# Patient Record
Sex: FEMALE | Race: WHITE | NOT HISPANIC OR LATINO | URBAN - METROPOLITAN AREA
[De-identification: names, ages, dates, MRNs, and addresses within clinical notes are randomized per-mention and may not be internally consistent; named-entity substitution may affect disease eponyms.]

---

## 2024-09-24 ENCOUNTER — INPATIENT (INPATIENT)
Facility: HOSPITAL | Age: 64
LOS: 2 days | Discharge: HOME | End: 2024-09-27
Attending: STUDENT IN AN ORGANIZED HEALTH CARE EDUCATION/TRAINING PROGRAM | Admitting: STUDENT IN AN ORGANIZED HEALTH CARE EDUCATION/TRAINING PROGRAM
Payer: SELF-PAY

## 2024-09-24 VITALS
SYSTOLIC BLOOD PRESSURE: 101 MMHG | OXYGEN SATURATION: 92 % | TEMPERATURE: 97 F | DIASTOLIC BLOOD PRESSURE: 70 MMHG | WEIGHT: 220.02 LBS | HEART RATE: 89 BPM | RESPIRATION RATE: 17 BRPM

## 2024-09-24 DIAGNOSIS — K46.0 UNSPECIFIED ABDOMINAL HERNIA WITH OBSTRUCTION, WITHOUT GANGRENE: ICD-10-CM

## 2024-09-24 LAB
ALBUMIN SERPL ELPH-MCNC: 3.3 G/DL — SIGNIFICANT CHANGE UP (ref 3.3–5)
ALP SERPL-CCNC: 111 U/L — SIGNIFICANT CHANGE UP (ref 40–120)
ALT FLD-CCNC: 14 U/L — SIGNIFICANT CHANGE UP (ref 4–33)
ANION GAP SERPL CALC-SCNC: 19 MMOL/L — HIGH (ref 7–14)
ANISOCYTOSIS BLD QL: SLIGHT — SIGNIFICANT CHANGE UP
APTT BLD: 26.9 SEC — SIGNIFICANT CHANGE UP (ref 24.5–35.6)
AST SERPL-CCNC: 18 U/L — SIGNIFICANT CHANGE UP (ref 4–32)
BASOPHILS # BLD AUTO: 0 K/UL — SIGNIFICANT CHANGE UP (ref 0–0.2)
BASOPHILS NFR BLD AUTO: 0 % — SIGNIFICANT CHANGE UP (ref 0–2)
BILIRUB SERPL-MCNC: 0.8 MG/DL — SIGNIFICANT CHANGE UP (ref 0.2–1.2)
BLD GP AB SCN SERPL QL: NEGATIVE — SIGNIFICANT CHANGE UP
BLOOD GAS VENOUS COMPREHENSIVE RESULT: SIGNIFICANT CHANGE UP
BUN SERPL-MCNC: 66 MG/DL — HIGH (ref 7–23)
CALCIUM SERPL-MCNC: 8.9 MG/DL — SIGNIFICANT CHANGE UP (ref 8.4–10.5)
CHLORIDE SERPL-SCNC: 75 MMOL/L — LOW (ref 98–107)
CO2 SERPL-SCNC: 26 MMOL/L — SIGNIFICANT CHANGE UP (ref 22–31)
CREAT SERPL-MCNC: 1.4 MG/DL — HIGH (ref 0.5–1.3)
EGFR: 42 ML/MIN/1.73M2 — LOW
EOSINOPHIL # BLD AUTO: 0 K/UL — SIGNIFICANT CHANGE UP (ref 0–0.5)
EOSINOPHIL NFR BLD AUTO: 0 % — SIGNIFICANT CHANGE UP (ref 0–6)
GIANT PLATELETS BLD QL SMEAR: PRESENT — SIGNIFICANT CHANGE UP
GLUCOSE SERPL-MCNC: 139 MG/DL — HIGH (ref 70–99)
HCT VFR BLD CALC: 44.6 % — SIGNIFICANT CHANGE UP (ref 34.5–45)
HGB BLD-MCNC: 15.8 G/DL — HIGH (ref 11.5–15.5)
IANC: 28.4 K/UL — HIGH (ref 1.8–7.4)
INR BLD: 1.44 RATIO — HIGH (ref 0.85–1.16)
LIDOCAIN IGE QN: 49 U/L — SIGNIFICANT CHANGE UP (ref 7–60)
LYMPHOCYTES # BLD AUTO: 1.13 K/UL — SIGNIFICANT CHANGE UP (ref 1–3.3)
LYMPHOCYTES # BLD AUTO: 3.5 % — LOW (ref 13–44)
MACROCYTES BLD QL: SLIGHT — SIGNIFICANT CHANGE UP
MCHC RBC-ENTMCNC: 33.8 PG — SIGNIFICANT CHANGE UP (ref 27–34)
MCHC RBC-ENTMCNC: 35.4 GM/DL — SIGNIFICANT CHANGE UP (ref 32–36)
MCV RBC AUTO: 95.5 FL — SIGNIFICANT CHANGE UP (ref 80–100)
METAMYELOCYTES # FLD: 4.3 % — HIGH (ref 0–1)
MONOCYTES # BLD AUTO: 1.97 K/UL — HIGH (ref 0–0.9)
MONOCYTES NFR BLD AUTO: 6.1 % — SIGNIFICANT CHANGE UP (ref 2–14)
NEUTROPHILS # BLD AUTO: 27.79 K/UL — HIGH (ref 1.8–7.4)
NEUTROPHILS NFR BLD AUTO: 76.5 % — SIGNIFICANT CHANGE UP (ref 43–77)
NEUTS BAND # BLD: 9.6 % — HIGH (ref 0–6)
PLAT MORPH BLD: NORMAL — SIGNIFICANT CHANGE UP
PLATELET # BLD AUTO: 274 K/UL — SIGNIFICANT CHANGE UP (ref 150–400)
PLATELET COUNT - ESTIMATE: NORMAL — SIGNIFICANT CHANGE UP
POIKILOCYTOSIS BLD QL AUTO: SLIGHT — SIGNIFICANT CHANGE UP
POLYCHROMASIA BLD QL SMEAR: SLIGHT — SIGNIFICANT CHANGE UP
POTASSIUM SERPL-MCNC: 4.7 MMOL/L — SIGNIFICANT CHANGE UP (ref 3.5–5.3)
POTASSIUM SERPL-SCNC: 4.7 MMOL/L — SIGNIFICANT CHANGE UP (ref 3.5–5.3)
PROT SERPL-MCNC: 7.2 G/DL — SIGNIFICANT CHANGE UP (ref 6–8.3)
PROTHROM AB SERPL-ACNC: 16.7 SEC — HIGH (ref 9.9–13.4)
RBC # BLD: 4.67 M/UL — SIGNIFICANT CHANGE UP (ref 3.8–5.2)
RBC # FLD: 12.3 % — SIGNIFICANT CHANGE UP (ref 10.3–14.5)
RBC BLD AUTO: ABNORMAL
RH IG SCN BLD-IMP: POSITIVE — SIGNIFICANT CHANGE UP
SODIUM SERPL-SCNC: 120 MMOL/L — CRITICAL LOW (ref 135–145)
WBC # BLD: 32.28 K/UL — HIGH (ref 3.8–10.5)
WBC # FLD AUTO: 32.28 K/UL — HIGH (ref 3.8–10.5)

## 2024-09-24 PROCEDURE — 99285 EMERGENCY DEPT VISIT HI MDM: CPT

## 2024-09-24 PROCEDURE — 88307 TISSUE EXAM BY PATHOLOGIST: CPT | Mod: 26

## 2024-09-24 PROCEDURE — 99223 1ST HOSP IP/OBS HIGH 75: CPT | Mod: GC,25

## 2024-09-24 PROCEDURE — 88305 TISSUE EXAM BY PATHOLOGIST: CPT | Mod: 26

## 2024-09-24 PROCEDURE — 44120 REMOVAL OF SMALL INTESTINE: CPT | Mod: GC,59

## 2024-09-24 PROCEDURE — 49616 RPR AA HRN RCR 3-10 NCR/STRN: CPT | Mod: GC,59

## 2024-09-24 PROCEDURE — 74177 CT ABD & PELVIS W/CONTRAST: CPT | Mod: 26,MC

## 2024-09-24 DEVICE — STAPLER COVIDIEN TRI-STAPLE 60MM PURPLE RELOAD: Type: IMPLANTABLE DEVICE | Status: FUNCTIONAL

## 2024-09-24 DEVICE — STAPLER COVIDIEN TA 60 BLUE: Type: IMPLANTABLE DEVICE | Status: FUNCTIONAL

## 2024-09-24 RX ORDER — SODIUM CHLORIDE IRRIG SOLUTION 0.9 %
1000 SOLUTION, IRRIGATION IRRIGATION
Refills: 0 | Status: DISCONTINUED | OUTPATIENT
Start: 2024-09-24 | End: 2024-09-25

## 2024-09-24 RX ORDER — HYDROMORPHONE HYDROCHLORIDE 1 MG/ML
0.5 INJECTION, SOLUTION INTRAMUSCULAR; INTRAVENOUS; SUBCUTANEOUS ONCE
Refills: 0 | Status: DISCONTINUED | OUTPATIENT
Start: 2024-09-24 | End: 2024-09-24

## 2024-09-24 RX ORDER — IOHEXOL 350 MG/ML
30 INJECTION, SOLUTION INTRAVENOUS ONCE
Refills: 0 | Status: COMPLETED | OUTPATIENT
Start: 2024-09-24 | End: 2024-09-24

## 2024-09-24 RX ORDER — ACETAMINOPHEN 325 MG
1000 TABLET ORAL ONCE
Refills: 0 | Status: COMPLETED | OUTPATIENT
Start: 2024-09-24 | End: 2024-09-24

## 2024-09-24 RX ORDER — FAMOTIDINE 40 MG
20 TABLET ORAL ONCE
Refills: 0 | Status: COMPLETED | OUTPATIENT
Start: 2024-09-24 | End: 2024-09-24

## 2024-09-24 RX ORDER — PIPERACILLIN SODIUM AND TAZOBACTAM SODIUM 12; 1.5 G/60ML; G/60ML
3.38 INJECTION, POWDER, LYOPHILIZED, FOR SOLUTION INTRAVENOUS ONCE
Refills: 0 | Status: COMPLETED | OUTPATIENT
Start: 2024-09-24 | End: 2024-09-24

## 2024-09-24 RX ORDER — ONDANSETRON HCL/PF 4 MG/2 ML
4 VIAL (ML) INJECTION ONCE
Refills: 0 | Status: COMPLETED | OUTPATIENT
Start: 2024-09-24 | End: 2024-09-24

## 2024-09-24 RX ORDER — PIPERACILLIN SODIUM AND TAZOBACTAM SODIUM 12; 1.5 G/60ML; G/60ML
3.38 INJECTION, POWDER, LYOPHILIZED, FOR SOLUTION INTRAVENOUS ONCE
Refills: 0 | Status: DISCONTINUED | OUTPATIENT
Start: 2024-09-24 | End: 2024-09-24

## 2024-09-24 RX ORDER — SODIUM CHLORIDE 0.9 % (FLUSH) 0.9 %
1000 SYRINGE (ML) INJECTION ONCE
Refills: 0 | Status: COMPLETED | OUTPATIENT
Start: 2024-09-24 | End: 2024-09-24

## 2024-09-24 RX ADMIN — Medication 20 MILLIGRAM(S): at 18:36

## 2024-09-24 RX ADMIN — PIPERACILLIN SODIUM AND TAZOBACTAM SODIUM 200 GRAM(S): 12; 1.5 INJECTION, POWDER, LYOPHILIZED, FOR SOLUTION INTRAVENOUS at 21:39

## 2024-09-24 RX ADMIN — Medication 1000 MILLILITER(S): at 18:34

## 2024-09-24 RX ADMIN — IOHEXOL 30 MILLILITER(S): 350 INJECTION, SOLUTION INTRAVENOUS at 19:04

## 2024-09-24 RX ADMIN — Medication 4 MILLIGRAM(S): at 18:38

## 2024-09-24 RX ADMIN — Medication 1000 MILLILITER(S): at 21:39

## 2024-09-24 RX ADMIN — Medication 400 MILLIGRAM(S): at 18:40

## 2024-09-24 RX ADMIN — HYDROMORPHONE HYDROCHLORIDE 0.5 MILLIGRAM(S): 1 INJECTION, SOLUTION INTRAMUSCULAR; INTRAVENOUS; SUBCUTANEOUS at 21:39

## 2024-09-24 NOTE — H&P ADULT - NSHPLABSRESULTS_GEN_ALL_CORE
LABS:  cret                        15.8   32.28 )-----------( 274      ( 24 Sep 2024 18:46 )             44.6     09-24    120[LL]  |  75[L]  |  66[H]  ----------------------------<  139[H]  4.7   |  26  |  1.40[H]    Ca    8.9      24 Sep 2024 18:46    TPro  7.2  /  Alb  3.3  /  TBili  0.8  /  DBili  x   /  AST  18  /  ALT  14  /  AlkPhos  111  09-24            RADIOLOGY     CT w closed loop bowel obstruction with perforation LABS:  cret                        15.8   32.28 )-----------( 274      ( 24 Sep 2024 18:46 )             44.6     09-24    120[LL]  |  75[L]  |  66[H]  ----------------------------<  139[H]  4.7   |  26  |  1.40[H]    Ca    8.9      24 Sep 2024 18:46    TPro  7.2  /  Alb  3.3  /  TBili  0.8  /  DBili  x   /  AST  18  /  ALT  14  /  AlkPhos  111  09-24            RADIOLOGY   CT Abdomen and Pelvis w/ Oral Cont and w/ IV Cont (09.24.24 @ 20:40)     FINDINGS:  LOWER CHEST: Small left pleural effusion with areas of atelectasis and   small consolidation in the bilateral lung bases. Dilated esophagus is   filled with fluid to the top of the study. A few clustered groundglass   opacities in the right lower lobe.    LIVER: Within normal limits.  BILE DUCTS: Normal caliber.  GALLBLADDER: Fluid-filled distended gallbladder is otherwise within   normal limits.  SPLEEN: Within normal limits.  PANCREAS: Within normal limits.  ADRENALS: Within normal limits.  KIDNEYS/URETERS: Symmetric enhancement bilaterally, no hydronephrosis.   Fluid attenuation left renal cyst.    BLADDER: Within normal limits.  REPRODUCTIVE ORGANS: 1.9 cm left adnexal cyst with small area of adjacent   calcification (series 301-95).    BOWEL: Marked dilation of the stomach and entire small bowel which   decompresses upon entrance to large ventral hernia. Within the hernia sac   the loop of bowel re-dilates prior to further decompression upon exit.   Dilated bowel within the sac demonstrates pneumatosis with notable fluid   and free air contained within the hernia sac. A portion of large bowel is   also within the hernia sac and demonstrates no acute inflammation.   Appendix is normal.  PERITONEUM/RETROPERITONEUM: Free air and notable fluid within a large   ventral hernia sac.  VESSELS: Atherosclerotic changes. No portal venous gas identified.  LYMPH NODES: No lymphadenopathy.  ABDOMINAL WALL: Large lower abdominal ventral hernia containing closed   loop bowel obstruction as described above. Opening to the hernia sac   measures approximately 4.6 cm.  BONES: Degenerative changes.    IMPRESSION:  Small bowel obstruction in a closed loop configuration secondary to large   ventral abdominal wall hernia. Pneumatosis in the excluded loop within   the hernia, free air and small volume ascites consistent with ischemic   perforation.    Few clustered right lower lobe groundglass opacities can be seen in the   setting of aspiration in the setting of a mildly distended and   fluid-filled esophagus.

## 2024-09-24 NOTE — H&P ADULT - HISTORY OF PRESENT ILLNESS
B TEAM SURGERY H&P NOTE  ELIZABETH CUI  |  [id number]  |  09-24-24 @ 22:06    HPI: 64F p/w lower abdominal pain. Patient feels like hernia is hard to touch and painful. Has not have a bowel movement in ~2 weeks. Has been having 3 weeks of abdominal pain but has worsened recently.  She has not passed gas from below in over 7 days. Initially had vomiting ~3 weeks ago but has not more recently. Also c/o heartburn. Started omeprazole for symptoms with minimal improvement.  No fevers or chills.  No surgical history.  States the hernia has been present for over 20 years and it is sometimes hard and up but says it would pass after she had a bowel movement.   No bright red blood per rectum.    INTERVAL EVENTS: In the ED, hemodynamically stable. Lactate 2.5, leukocytosis, CT showing closed loop bowel obstruction in the hernia with perforation. Surgery consulted.

## 2024-09-24 NOTE — H&P ADULT - ASSESSMENT
Assessment: 64F no PMH/PSH p/w 3 weeks abdominal pain, obstipation, found to be dehydrated w leukocytosis, elevated lactate, and CT findings of closed loop bowel obstruction with perforation within ventral hernia     Plan   - IV fluid resuscitation   - IV abx: Zosyn   - OR for emergent exploratory laparotomy, possible bowel resection, possible ABThera  - Consent signed in chart     Discussed with Berny Foster    Please contact B Team Surgery (p. [phone]) with any questions.     Lillian Frank DO, PhD  B Team Surgery  Pager [phone]

## 2024-09-24 NOTE — ED ADULT NURSE REASSESSMENT NOTE - NS ED NURSE REASSESS COMMENT FT1
Pt received from intake, alert and oriented x4, able to move all extremities and follow commands. Abdomen firm and distended. OR team at the bedside, NG tube placed to right nostril with med suction. Pt reports some relief of abdominal pain. Brown drainage noted to suction canister. No sob noted. Pending admit to OR. Pt received from intake, alert and oriented x4, able to move all extremities and follow commands. Abdomen firm and distended. Surgery team at the bedside, NG tube placed to right nostril with med suction. Pt reports some relief of abdominal pain. Brown drainage noted to suction canister. No sob noted. Pending admit to OR.

## 2024-09-24 NOTE — ED ADULT NURSE NOTE - OBJECTIVE STATEMENT
Patient coming to ED for vomiting and constipation x 3 weeks. Patient took enema at home but unsuccessful. Patient A&OX4. No distress noted. Breathing non-labored. Labs sent. Left 20g IVL in place and tolerating well. CT scan ordered. Plan of care in progress.

## 2024-09-24 NOTE — H&P ADULT - NSHPPHYSICALEXAM_GEN_ALL_CORE
Vital Signs Last 24 Hrs  T(C): 36.2 (24 Sep 2024 16:30), Max: 36.2 (24 Sep 2024 16:30)  T(F): 97.2 (24 Sep 2024 16:30), Max: 97.2 (24 Sep 2024 16:30)  HR: 89 (24 Sep 2024 16:30) (89 - 89)  BP: 101/70 (24 Sep 2024 16:30) (101/70 - 101/70)  BP(mean): --  RR: 17 (24 Sep 2024 16:30) (17 - 17)  SpO2: 92% (24 Sep 2024 16:30) (92% - 92%)    Parameters below as of 24 Sep 2024 16:30  Patient On (Oxygen Delivery Method): room air    PHYSICAL EXAM  GEN: No acute distress, resting comfortably   HEENT: NCAT   NEURO: no gross deficits   CV: RRR  RESP: No increased work of breathing   ABD: soft, large ventral hernia tense and errythematous, tender to palpation, distended, no rebound or guarding / no peritoneal signs   EXT: warm and well perfused

## 2024-09-24 NOTE — ED ADULT TRIAGE NOTE - CHIEF COMPLAINT QUOTE
pt states 3 weeks ago she was vomiting and developed constipation, was told to take a fleet enema, but was unsuccessful.

## 2024-09-24 NOTE — ED PROVIDER NOTE - PROGRESS NOTE DETAILS
[facility] PGY3 - CT abdomen pelvis with evidence of incarcerated and perforated hernia.  Surgery at bedside for consent for ex lap.  Pain medication ordered.  Admit to surgery for exploratory laparotomy.

## 2024-09-24 NOTE — ED PROVIDER NOTE - GASTROINTESTINAL, MLM
Abdomen   Large hernia in the bilateral lower quadrants of her abdomen that is hard to palpation and tender to palpation.

## 2024-09-24 NOTE — ED PROVIDER NOTE - OBJECTIVE STATEMENT
64-year-old female presents to the ED with lower abdominal pain.  She feels like her hernia is hard to touch and painful.  She also states she has not have a bowel movement in over 7 days.  She has not passed gas from below in over 7 days.  3 weeks ago she had several days of vomiting which is now resolved.  She is complaining of heartburn.  She started omeprazole for symptoms with minimal improvement.  No fevers or chills.  No surgical history.  Says the hernia has been present for over 20 years and it is sometimes hard and up but says it would pass after she had a bowel movement.   No bright red blood per rectum.

## 2024-09-24 NOTE — ED ADULT NURSE NOTE - NSFALLUNIVINTERV_ED_ALL_ED
Bed/Stretcher in lowest position, wheels locked, appropriate side rails in place/Call bell, personal items and telephone in reach/Instruct patient to call for assistance before getting out of bed/chair/stretcher/Non-slip footwear applied when patient is off stretcher/[place] to call system/Physically safe environment - no spills, clutter or unnecessary equipment/Purposeful proactive rounding/Room/bathroom lighting operational, light cord in reach

## 2024-09-24 NOTE — ED PROVIDER NOTE - ATTENDING CONTRIBUTION TO CARE
I, Bradley Arceo DO,  performed the initial face to face bedside interview with this patient regarding history of present illness, review of symptoms and relevant past medical, social and family history.  I completed an independent physical examination.  I was the initial provider who evaluated this patient. I have signed out the follow up of any pending tests (i.e. labs, radiological studies) to the resident.  I have communicated the patient’s plan of care and disposition with the resident.  The history, relevant review of systems, past medical and surgical history, medical decision making, and physical examination was documented by the scribe in my presence and I attest to the accuracy of the documentation.

## 2024-09-24 NOTE — ED PROVIDER NOTE - CLINICAL SUMMARY MEDICAL DECISION MAKING FREE TEXT BOX
Evaluation for adult female with a history of hernia for constipation nausea vomiting no fluctuance.  On exam has a hard tender hernia to the lower quadrants of her abdomen.  She is otherwise well-appearing with normal vital signs.  She is afebrile.  Plan is can be labs UA CT abdomen pelvis with oral and IV contrast to rule out SBO.  Will also get a lactate as there is concern for possible incarcerated strangulated hernia.

## 2024-09-25 LAB
ANION GAP SERPL CALC-SCNC: 13 MMOL/L — SIGNIFICANT CHANGE UP (ref 7–14)
ANION GAP SERPL CALC-SCNC: 13 MMOL/L — SIGNIFICANT CHANGE UP (ref 7–14)
ANION GAP SERPL CALC-SCNC: 16 MMOL/L — HIGH (ref 7–14)
BUN SERPL-MCNC: 59 MG/DL — HIGH (ref 7–23)
BUN SERPL-MCNC: 62 MG/DL — HIGH (ref 7–23)
BUN SERPL-MCNC: 64 MG/DL — HIGH (ref 7–23)
CALCIUM SERPL-MCNC: 6.8 MG/DL — LOW (ref 8.4–10.5)
CALCIUM SERPL-MCNC: 6.9 MG/DL — LOW (ref 8.4–10.5)
CALCIUM SERPL-MCNC: 7.2 MG/DL — LOW (ref 8.4–10.5)
CHLORIDE SERPL-SCNC: 86 MMOL/L — LOW (ref 98–107)
CHLORIDE SERPL-SCNC: 89 MMOL/L — LOW (ref 98–107)
CHLORIDE SERPL-SCNC: 90 MMOL/L — LOW (ref 98–107)
CO2 SERPL-SCNC: 20 MMOL/L — LOW (ref 22–31)
CO2 SERPL-SCNC: 21 MMOL/L — LOW (ref 22–31)
CO2 SERPL-SCNC: 22 MMOL/L — SIGNIFICANT CHANGE UP (ref 22–31)
CREAT SERPL-MCNC: 1.29 MG/DL — SIGNIFICANT CHANGE UP (ref 0.5–1.3)
CREAT SERPL-MCNC: 1.3 MG/DL — SIGNIFICANT CHANGE UP (ref 0.5–1.3)
CREAT SERPL-MCNC: 1.41 MG/DL — HIGH (ref 0.5–1.3)
EGFR: 42 ML/MIN/1.73M2 — LOW
EGFR: 46 ML/MIN/1.73M2 — LOW
EGFR: 46 ML/MIN/1.73M2 — LOW
GAS PNL BLDV: SIGNIFICANT CHANGE UP
GAS PNL BLDV: SIGNIFICANT CHANGE UP
GLUCOSE SERPL-MCNC: 111 MG/DL — HIGH (ref 70–99)
GLUCOSE SERPL-MCNC: 114 MG/DL — HIGH (ref 70–99)
GLUCOSE SERPL-MCNC: 123 MG/DL — HIGH (ref 70–99)
HCT VFR BLD CALC: 35 % — SIGNIFICANT CHANGE UP (ref 34.5–45)
HCT VFR BLD CALC: 35.9 % — SIGNIFICANT CHANGE UP (ref 34.5–45)
HGB BLD-MCNC: 12.1 G/DL — SIGNIFICANT CHANGE UP (ref 11.5–15.5)
HGB BLD-MCNC: 12.7 G/DL — SIGNIFICANT CHANGE UP (ref 11.5–15.5)
MAGNESIUM SERPL-MCNC: 2.1 MG/DL — SIGNIFICANT CHANGE UP (ref 1.6–2.6)
MAGNESIUM SERPL-MCNC: 2.1 MG/DL — SIGNIFICANT CHANGE UP (ref 1.6–2.6)
MAGNESIUM SERPL-MCNC: 2.2 MG/DL — SIGNIFICANT CHANGE UP (ref 1.6–2.6)
MCHC RBC-ENTMCNC: 33.8 PG — SIGNIFICANT CHANGE UP (ref 27–34)
MCHC RBC-ENTMCNC: 34.5 PG — HIGH (ref 27–34)
MCHC RBC-ENTMCNC: 34.6 GM/DL — SIGNIFICANT CHANGE UP (ref 32–36)
MCHC RBC-ENTMCNC: 35.4 GM/DL — SIGNIFICANT CHANGE UP (ref 32–36)
MCV RBC AUTO: 97.6 FL — SIGNIFICANT CHANGE UP (ref 80–100)
MCV RBC AUTO: 97.8 FL — SIGNIFICANT CHANGE UP (ref 80–100)
NRBC # BLD: 0 /100 WBCS — SIGNIFICANT CHANGE UP (ref 0–0)
NRBC # BLD: 0 /100 WBCS — SIGNIFICANT CHANGE UP (ref 0–0)
NRBC # FLD: 0 K/UL — SIGNIFICANT CHANGE UP (ref 0–0)
NRBC # FLD: 0 K/UL — SIGNIFICANT CHANGE UP (ref 0–0)
OSMOLALITY UR: 467 MOSM/KG — SIGNIFICANT CHANGE UP (ref 50–1200)
PHOSPHATE SERPL-MCNC: 4.8 MG/DL — HIGH (ref 2.5–4.5)
PHOSPHATE SERPL-MCNC: 4.9 MG/DL — HIGH (ref 2.5–4.5)
PHOSPHATE SERPL-MCNC: 5.1 MG/DL — HIGH (ref 2.5–4.5)
PLATELET # BLD AUTO: 171 K/UL — SIGNIFICANT CHANGE UP (ref 150–400)
PLATELET # BLD AUTO: 192 K/UL — SIGNIFICANT CHANGE UP (ref 150–400)
POTASSIUM SERPL-MCNC: 3.7 MMOL/L — SIGNIFICANT CHANGE UP (ref 3.5–5.3)
POTASSIUM SERPL-MCNC: 3.9 MMOL/L — SIGNIFICANT CHANGE UP (ref 3.5–5.3)
POTASSIUM SERPL-MCNC: 4.1 MMOL/L — SIGNIFICANT CHANGE UP (ref 3.5–5.3)
POTASSIUM SERPL-SCNC: 3.7 MMOL/L — SIGNIFICANT CHANGE UP (ref 3.5–5.3)
POTASSIUM SERPL-SCNC: 3.9 MMOL/L — SIGNIFICANT CHANGE UP (ref 3.5–5.3)
POTASSIUM SERPL-SCNC: 4.1 MMOL/L — SIGNIFICANT CHANGE UP (ref 3.5–5.3)
RBC # BLD: 3.58 M/UL — LOW (ref 3.8–5.2)
RBC # BLD: 3.68 M/UL — LOW (ref 3.8–5.2)
RBC # FLD: 12.4 % — SIGNIFICANT CHANGE UP (ref 10.3–14.5)
RBC # FLD: 12.5 % — SIGNIFICANT CHANGE UP (ref 10.3–14.5)
SODIUM SERPL-SCNC: 121 MMOL/L — LOW (ref 135–145)
SODIUM SERPL-SCNC: 123 MMOL/L — LOW (ref 135–145)
SODIUM SERPL-SCNC: 126 MMOL/L — LOW (ref 135–145)
SODIUM UR-SCNC: <20 MMOL/L — SIGNIFICANT CHANGE UP
WBC # BLD: 14.01 K/UL — HIGH (ref 3.8–10.5)
WBC # BLD: 17.01 K/UL — HIGH (ref 3.8–10.5)
WBC # FLD AUTO: 14.01 K/UL — HIGH (ref 3.8–10.5)
WBC # FLD AUTO: 17.01 K/UL — HIGH (ref 3.8–10.5)

## 2024-09-25 PROCEDURE — 71045 X-RAY EXAM CHEST 1 VIEW: CPT | Mod: 26,76

## 2024-09-25 RX ORDER — ACETAMINOPHEN 325 MG
1000 TABLET ORAL EVERY 6 HOURS
Refills: 0 | Status: COMPLETED | OUTPATIENT
Start: 2024-09-25 | End: 2024-09-25

## 2024-09-25 RX ORDER — INFLUENZA VIRUS VACCINE 15; 15; 15; 15 UG/.5ML; UG/.5ML; UG/.5ML; UG/.5ML
0.5 SUSPENSION INTRAMUSCULAR ONCE
Refills: 0 | Status: DISCONTINUED | OUTPATIENT
Start: 2024-09-25 | End: 2024-09-27

## 2024-09-25 RX ORDER — CHLORHEXIDINE GLUCONATE ORAL RINSE 1.2 MG/ML
1 SOLUTION DENTAL DAILY
Refills: 0 | Status: DISCONTINUED | OUTPATIENT
Start: 2024-09-25 | End: 2024-09-27

## 2024-09-25 RX ORDER — HYDROMORPHONE HYDROCHLORIDE 1 MG/ML
1 INJECTION, SOLUTION INTRAMUSCULAR; INTRAVENOUS; SUBCUTANEOUS
Refills: 0 | Status: DISCONTINUED | OUTPATIENT
Start: 2024-09-25 | End: 2024-09-25

## 2024-09-25 RX ORDER — SODIUM CHLORIDE 0.9 % (FLUSH) 0.9 %
1000 SYRINGE (ML) INJECTION ONCE
Refills: 0 | Status: COMPLETED | OUTPATIENT
Start: 2024-09-25 | End: 2024-09-25

## 2024-09-25 RX ORDER — SODIUM CHLORIDE 0.9 % (FLUSH) 0.9 %
1000 SYRINGE (ML) INJECTION
Refills: 0 | Status: DISCONTINUED | OUTPATIENT
Start: 2024-09-25 | End: 2024-09-26

## 2024-09-25 RX ORDER — ACETAMINOPHEN 325 MG
1000 TABLET ORAL EVERY 6 HOURS
Refills: 0 | Status: COMPLETED | OUTPATIENT
Start: 2024-09-26 | End: 2024-09-26

## 2024-09-25 RX ORDER — PIPERACILLIN SODIUM AND TAZOBACTAM SODIUM 12; 1.5 G/60ML; G/60ML
3.38 INJECTION, POWDER, LYOPHILIZED, FOR SOLUTION INTRAVENOUS EVERY 8 HOURS
Refills: 0 | Status: DISCONTINUED | OUTPATIENT
Start: 2024-09-25 | End: 2024-09-25

## 2024-09-25 RX ORDER — ENOXAPARIN SODIUM 150 MG/ML
40 INJECTION SUBCUTANEOUS EVERY 24 HOURS
Refills: 0 | Status: DISCONTINUED | OUTPATIENT
Start: 2024-09-25 | End: 2024-09-27

## 2024-09-25 RX ORDER — PIPERACILLIN SODIUM AND TAZOBACTAM SODIUM 12; 1.5 G/60ML; G/60ML
3.38 INJECTION, POWDER, LYOPHILIZED, FOR SOLUTION INTRAVENOUS EVERY 8 HOURS
Refills: 0 | Status: DISCONTINUED | OUTPATIENT
Start: 2024-09-25 | End: 2024-09-27

## 2024-09-25 RX ORDER — HYDROMORPHONE HYDROCHLORIDE 1 MG/ML
0.5 INJECTION, SOLUTION INTRAMUSCULAR; INTRAVENOUS; SUBCUTANEOUS EVERY 4 HOURS
Refills: 0 | Status: DISCONTINUED | OUTPATIENT
Start: 2024-09-25 | End: 2024-09-27

## 2024-09-25 RX ORDER — HYDROMORPHONE HYDROCHLORIDE 1 MG/ML
0.5 INJECTION, SOLUTION INTRAMUSCULAR; INTRAVENOUS; SUBCUTANEOUS
Refills: 0 | Status: DISCONTINUED | OUTPATIENT
Start: 2024-09-25 | End: 2024-09-25

## 2024-09-25 RX ORDER — HYDROMORPHONE HYDROCHLORIDE 1 MG/ML
0.2 INJECTION, SOLUTION INTRAMUSCULAR; INTRAVENOUS; SUBCUTANEOUS EVERY 4 HOURS
Refills: 0 | Status: DISCONTINUED | OUTPATIENT
Start: 2024-09-25 | End: 2024-09-27

## 2024-09-25 RX ADMIN — PIPERACILLIN SODIUM AND TAZOBACTAM SODIUM 25 GRAM(S): 12; 1.5 INJECTION, POWDER, LYOPHILIZED, FOR SOLUTION INTRAVENOUS at 14:15

## 2024-09-25 RX ADMIN — Medication 400 MILLIGRAM(S): at 18:10

## 2024-09-25 RX ADMIN — PIPERACILLIN SODIUM AND TAZOBACTAM SODIUM 25 GRAM(S): 12; 1.5 INJECTION, POWDER, LYOPHILIZED, FOR SOLUTION INTRAVENOUS at 23:00

## 2024-09-25 RX ADMIN — Medication 120 MILLILITER(S): at 03:14

## 2024-09-25 RX ADMIN — Medication 400 MILLIGRAM(S): at 23:58

## 2024-09-25 RX ADMIN — Medication 200 GRAM(S): at 05:19

## 2024-09-25 RX ADMIN — Medication 125 MILLILITER(S): at 14:15

## 2024-09-25 RX ADMIN — Medication 400 MILLIGRAM(S): at 06:27

## 2024-09-25 RX ADMIN — Medication 1000 MILLIGRAM(S): at 18:30

## 2024-09-25 RX ADMIN — Medication 1000 MILLILITER(S): at 03:14

## 2024-09-25 RX ADMIN — Medication 1000 MILLIGRAM(S): at 11:56

## 2024-09-25 RX ADMIN — Medication 120 MILLILITER(S): at 05:17

## 2024-09-25 RX ADMIN — PIPERACILLIN SODIUM AND TAZOBACTAM SODIUM 25 GRAM(S): 12; 1.5 INJECTION, POWDER, LYOPHILIZED, FOR SOLUTION INTRAVENOUS at 06:27

## 2024-09-25 RX ADMIN — Medication 1000 MILLIGRAM(S): at 06:27

## 2024-09-25 RX ADMIN — Medication 1000 MILLILITER(S): at 07:39

## 2024-09-25 RX ADMIN — Medication 400 MILLIGRAM(S): at 11:49

## 2024-09-25 NOTE — CONSULT NOTE ADULT - CONSULT REQUESTED DATE/TIME
Patient  Ingris Rodriguez called concerned in reference to patient Mrs. Alma Rosa Adam not having surgery. Patient's  wished to speak with administration due to concerns.  Mr. Idris Osei was informed he would be contacted by  Brady Irby
Spoke with patient to relay that surgery is available at [facility] on Thursday 9/24/2020. Time is not yet available. Patient elected to have surgery on 9/24/2020. Reviewed where to go to get COVID 19 test and Type and Screen completed on Friday. Patient had no further questions.
25-Sep-2024 05:35

## 2024-09-25 NOTE — CHART NOTE - NSCHARTNOTEFT_GEN_A_CORE
SURGERY POST OP CHECK    STATUS POST PROCEDURE: ex lap, small bowel resection w primary anastomosis, primary hernia repair    SUBJECTIVE: Pt seen and examined without complaints. Pain is controlled. Denies CP/SOB/N/V.   In PACU      OBJECTIVE:  Vital Signs Last 24 Hrs  T(C): 36.6 (25 Sep 2024 03:00), Max: 36.6 (25 Sep 2024 03:00)  T(F): 97.8 (25 Sep 2024 03:00), Max: 97.8 (25 Sep 2024 03:00)  HR: 80 (25 Sep 2024 03:00) (67 - 89)  BP: 113/70 (25 Sep 2024 03:00) (101/70 - 123/68)  BP(mean): 81 (25 Sep 2024 03:00) (72 - 82)  RR: 19 (25 Sep 2024 03:00) (14 - 19)  SpO2: 100% (25 Sep 2024 03:00) (92% - 100%)    Parameters below as of 25 Sep 2024 03:00  Patient On (Oxygen Delivery Method): room air      I&O's Summary    24 Sep 2024 07:01  -  25 Sep 2024 03:36  --------------------------------------------------------  IN: 240 mL / OUT: 409 mL / NET: -169 mL        PHYSICAL EXAM:  Gen: NAD, A&Ox3  Pulm: No respiratory distress, no subcostal retractions  CV: RRR, no JVD  Abd: Soft, NT, ND, incision with dressing in place c/d/i  Drains: SubQ drain in place  Matute in place  NGT in place confirmed by xray   Extremities: FROM, warm and well perfused    ASSESSMENT/PLAN: HPI:  B TEAM SURGERY H&P NOTE  ELIZABETH CUI  |  [id number]  |  09-24-24 @ 22:06    HPI: 64F p/w lower abdominal pain. Patient feels like hernia is hard to touch and painful. Has not have a bowel movement in ~2 weeks. Has been having 3 weeks of abdominal pain but has worsened recently.  She has not passed gas from below in over 7 days. Initially had vomiting ~3 weeks ago but has not more recently. Also c/o heartburn. Started omeprazole for symptoms with minimal improvement.  No fevers or chills.  No surgical history.  States the hernia has been present for over 20 years and it is sometimes hard and up but says it would pass after she had a bowel movement.   No bright red blood per rectum.    INTERVAL EVENTS: In the ED, hemodynamically stable. Lactate 2.5, leukocytosis, CT showing closed loop bowel obstruction in the hernia with perforation. Surgery consulted.   . Pt is s/p ; POD #0 ex lap, small bowel resection w primary anastomosis, primary hernia repair 9/25.  IN PACU, stable at POC, on LR drip. Patient reports feeling well overall. post op sodium 121, 120 pre op. patient potentially going to SICU, pending further workup. Stay in PACU for now.  Will give 1L NS bolus and recheck labs.    - NS bolus 1L  - f/u post bolus labs  - dispo - SICU v floor, keep in PACU until surgical team calls PACU for OK and dispo plan   - Monitor bowel function   - Analgesia and antiemetics as needed  - Strict I&O's  - Monitor NGT  - Monitor TOÑA drain output- TOÑA drain teaching  - Keep Matute  - Incentive spirometry  - DVT prophylaxis: SCD SURGERY POST OP CHECK    STATUS POST PROCEDURE: ex lap, small bowel resection w primary anastomosis, primary hernia repair    SUBJECTIVE: Pt seen and examined without complaints. Pain is controlled. Denies CP/SOB/N/V.   In PACU      OBJECTIVE:  Vital Signs Last 24 Hrs  T(C): 36.6 (25 Sep 2024 03:00), Max: 36.6 (25 Sep 2024 03:00)  T(F): 97.8 (25 Sep 2024 03:00), Max: 97.8 (25 Sep 2024 03:00)  HR: 80 (25 Sep 2024 03:00) (67 - 89)  BP: 113/70 (25 Sep 2024 03:00) (101/70 - 123/68)  BP(mean): 81 (25 Sep 2024 03:00) (72 - 82)  RR: 19 (25 Sep 2024 03:00) (14 - 19)  SpO2: 100% (25 Sep 2024 03:00) (92% - 100%)    Parameters below as of 25 Sep 2024 03:00  Patient On (Oxygen Delivery Method): room air      I&O's Summary    24 Sep 2024 07:01  -  25 Sep 2024 03:36  --------------------------------------------------------  IN: 240 mL / OUT: 409 mL / NET: -169 mL        PHYSICAL EXAM:  Gen: NAD, A&Ox3  Pulm: No respiratory distress, no subcostal retractions  CV: RRR, no JVD  Abd: Soft, NT, ND, incision with prevena in place c/d/i holding suction  Drains: SubQ drain in place  Matute in place  NGT in place confirmed by xray   Extremities: FROM, warm and well perfused    ASSESSMENT/PLAN: HPI:  B TEAM SURGERY H&P NOTE  ELIZABETH CUI  |  [id number]  |  09-24-24 @ 22:06    HPI: 64F p/w lower abdominal pain. Patient feels like hernia is hard to touch and painful. Has not have a bowel movement in ~2 weeks. Has been having 3 weeks of abdominal pain but has worsened recently.  She has not passed gas from below in over 7 days. Initially had vomiting ~3 weeks ago but has not more recently. Also c/o heartburn. Started omeprazole for symptoms with minimal improvement.  No fevers or chills.  No surgical history.  States the hernia has been present for over 20 years and it is sometimes hard and up but says it would pass after she had a bowel movement.   No bright red blood per rectum.    INTERVAL EVENTS: In the ED, hemodynamically stable. Lactate 2.5, leukocytosis, CT showing closed loop bowel obstruction in the hernia with perforation. Surgery consulted.   . Pt is s/p ; POD #0 ex lap, small bowel resection w primary anastomosis, primary hernia repair 9/25.  IN PACU, stable at POC, on LR drip. Patient reports feeling well overall. post op sodium 121, 120 pre op. patient potentially going to SICU, pending further workup. Stay in PACU for now.  Will give 1L NS bolus and recheck labs.    - NS bolus 1L  - f/u post bolus labs  - dispo - SICU v floor, keep in PACU until surgical team calls PACU for OK and dispo plan   - Monitor bowel function   - Analgesia and antiemetics as needed  - Strict I&O's  - Monitor NGT  - Monitor TOÑA drain output- TOÑA drain teaching  - Keep Matute  - Incentive spirometry  - DVT prophylaxis: SCD

## 2024-09-25 NOTE — CHART NOTE - NSCHARTNOTEFT_GEN_A_CORE
patient transferred from sicu to floors  signed out to resident  all questions addressed and answered.    [id number]

## 2024-09-25 NOTE — CONSULT NOTE ADULT - ASSESSMENT
ASSESSMENT:  64F p/w lower abdominal pain. Patient feels like hernia is hard to touch and painful. Has not have a bowel movement in ~2 weeks. Has been having 3 weeks of abdominal pain but has worsened recently.  She has not passed gas from below in over 7 days. CT showing closed loop bowel obstruction in the hernia with perforation. Pt is now s/p ex lap, SBR, umbilical hernia repair w/ lipectomy. Feculent peritonitis w/ purulence encountered intra-operatively. SICU consulted for post-operative hyponatremia and HD monitoring i/s/o case complexity and purulence.     PLAN:  NEUROLOGIC   - Pain control: IV tylenol, prn dilaudid    RESPIRATORY   - Monitor SpO2 goal >92%  - Incentive spirometry     CARDIOVASCULAR   - Monitor hemodynamics     GASTROINTESTINAL   - Diet: NPO  - NGT to LCWS    /RENAL   - IV fluids: NS at 120  - Maintain edmonds catheter, strict Is/Os  - Monitor electrolytes, replete PRN  - trend Na, replete    HEMATOLOGIC  - Monitor H/H   - DVT ppx: Lovenox & SCD's    INFECTIOUS DISEASE  - Monitor fever / WBC    ENDOCRINE  - Monitor gluc    LINES  - Edmonds  - TOÑA x 1  - PIV     DISPO: SICU

## 2024-09-25 NOTE — BRIEF OPERATIVE NOTE - NSICDXBRIEFPOSTOP_GEN_ALL_CORE_FT
POST-OP DIAGNOSIS:  Strangulated ventral hernia 25-Sep-2024 03:56:47  Gosia Mulligan  Small bowel perforation 25-Sep-2024 03:56:53  Gosia Mulligan

## 2024-09-25 NOTE — PROGRESS NOTE ADULT - ASSESSMENT
ASSESSMENT & PLAN:   64F p/w lower abdominal pain. Patient feels like hernia is hard to touch and painful. Has not have a bowel movement in ~2 weeks. Has been having 3 weeks of abdominal pain but has worsened recently.  She has not passed gas from below in over 7 days. CT showing closed loop bowel obstruction in the hernia with perforation. Pt is now s/p ex lap, SBR, umbilical hernia repair w/ lipectomy. Feculent peritonitis w/ purulence encountered intra-operatively. SICU consulted for post-operative hyponatremia and HD monitoring i/s/o case complexity and purulence.       For Follow-Up:  - Zosyn from 9/25-9/29 (4d course)   - trending Na   - continuing NS resuscitation   - Pain control PRN  - NPO with NGT   - Matute maintaining   - DVT ppx: lovenox, SCDs  - f/u AM labs  ASSESSMENT & PLAN:   64F p/w lower abdominal pain. Patient feels like hernia is hard to touch and painful. Has not have a bowel movement in ~2 weeks. Has been having 3 weeks of abdominal pain but has worsened recently.  She has not passed gas from below in over 7 days. CT showing closed loop bowel obstruction in the hernia with perforation. Pt is now s/p ex lap, SBR, umbilical hernia repair w/ lipectomy. Feculent peritonitis w/ purulence encountered intra-operatively. SICU consulted for post-operative hyponatremia and HD monitoring i/s/o case complexity and purulence.     For Follow-Up:  - Zosyn from 9/25-9/29 (4d course)   - trending Na, improving  - Diet: trial CLD today, half IVF if tolerating  - continuing NS resuscitation   - Pain control PRN  - NPO with NGT w/ little to no output. NGT partially self D/C'ed overnight.   - Matute removal today, follow up TOV   - DVT ppx: lovenox, SCDs    B Team  #[id number]     ASSESSMENT & PLAN:   64F p/w lower abdominal pain. Patient feels like hernia is hard to touch and painful. Has not have a bowel movement in ~2 weeks. Has been having 3 weeks of abdominal pain but has worsened recently.  She has not passed gas from below in over 7 days. CT showing closed loop bowel obstruction in the hernia with perforation. Pt is now s/p ex lap, SBR, umbilical hernia repair w/ lipectomy. Feculent peritonitis w/ purulence encountered intra-operatively. SICU consulted for post-operative hyponatremia and HD monitoring i/s/o case complexity and purulence.     For Follow-Up:  - Zosyn from 9/25-9/29 (4d course)   - trending Na, improving  - Diet: trial CLD today, half IVF if tolerating  - continuing NS resuscitation   - Pain control PRN  - NPO with NGT w/ little to no output. NGT partially self D/C'ed overnight and removed this morning.   - Matute removal today, follow up TOV   - DVT ppx: lovenox, SCDs    B Team  #[id number]     ASSESSMENT & PLAN:   64F p/w lower abdominal pain. Patient feels like hernia is hard to touch and painful. Has not have a bowel movement in ~2 weeks. Has been having 3 weeks of abdominal pain but has worsened recently.  She has not passed gas from below in over 7 days. CT showing closed loop bowel obstruction in the hernia with perforation. Pt is now s/p ex lap, SBR, umbilical hernia repair w/ lipectomy. Feculent peritonitis w/ purulence encountered intra-operatively. SICU consulted for post-operative hyponatremia and HD monitoring i/s/o case complexity and purulence.     For Follow-Up:  - Zosyn from 9/25-9/29 (4d course)   - trending Na, improving  - Diet: trial CLD today, half IVF if tolerating  - continuing NS resuscitation   - Pain control PRN  - NPO with NGT w/ little to no output. NGT partially self D/C'ed overnight and removed this morning.   - L TOÑA drain output -> patient self removed this AM   - Matute removal today, follow up TOV   - DVT ppx: lovenox, SCDs    B Team  #[id number]     ASSESSMENT & PLAN:   64F p/w lower abdominal pain. Patient feels like hernia is hard to touch and painful. Has not have a bowel movement in ~2 weeks. Has been having 3 weeks of abdominal pain but has worsened recently.  She has not passed gas from below in over 7 days. CT showing closed loop bowel obstruction in the hernia with perforation. Pt is now s/p ex lap, SBR, umbilical hernia repair w/ lipectomy. Feculent peritonitis w/ purulence encountered intra-operatively. SICU consulted for post-operative hyponatremia and HD monitoring i/s/o case complexity and purulence.     For Follow-Up:  - Zosyn from 9/25-9/29 (4d course)   - trending Na, improving  - Diet: trial CLD today, half IVF if tolerating  - continuing NS resuscitation   - Pain control PRN  - NPO with NGT w/ little to no output. NGT partially pulled out overnight d/t statlock not sticking. Removed this morning.   - Edmonds removed -> TOV @ 1840  - L TOÑA drain output -> patient self removed this AM as she thought it was part of edmonds drainage   - DVT ppx: lovenox, SCDs    B Team  #[id number]     ASSESSMENT & PLAN:   64F p/w lower abdominal pain. Patient feels like hernia is hard to touch and painful. Has not have a bowel movement in ~2 weeks. Has been having 3 weeks of abdominal pain but has worsened recently.  She has not passed gas from below in over 7 days. CT showing closed loop bowel obstruction in the hernia with perforation. Pt is now s/p ex lap, SBR, umbilical hernia repair w/ lipectomy. Feculent peritonitis w/ purulence encountered intra-operatively. SICU consulted for post-operative hyponatremia and HD monitoring i/s/o case complexity and purulence.     For Follow-Up:  - Zosyn from 9/25-9/29 (4d course)   - trending Na, improving  - Diet: NPO/IVF/NGT  - continuing NS resuscitation   - Pain control PRN  - Matute in place  - Monitor L TOÑA drain output  - DVT ppx: lovenox, SCDs    B Team  #[id number]

## 2024-09-25 NOTE — PATIENT PROFILE ADULT - HEALTH LITERACY
Saul Hansen Patient Age: 54 year old  MESSAGE: Interpreting service used: No    Insurance on file confirmed with caller: Yes    IM/FP- Medication Question-     Name of the Pharmacy: [facility]     Name of the medication:    linaGLIPtin (TRADJENTA) 5 MG tablet       Question about: Medication not covered by patient's insurance     Suggested alternative medication (if provided): n/a    Is the patient currently at the pharmacy? No- Routed message to Provider's Clinical Pool    Message read back to caller for accuracy: Yes       ALLERGIES:  Patient has no known allergies.  Current Outpatient Medications   Medication Sig Dispense Refill    metFORMIN (GLUCOPHAGE-XR) 500 MG 24 hr tablet 2 tabs  tablet 1    linaGLIPtin (TRADJENTA) 5 MG tablet Take 1 tablet by mouth daily. 90 tablet 1    atorvastatin (LIPITOR) 20 MG tablet 1 tab daily for 10 days, then increase to 2 tabs daily 180 tablet 1    tamsulosin (FLOMAX) 0.4 MG Cap Take 1 capsule by mouth at bedtime. 90 capsule 3    tadalafil (CIALIS) 20 MG tablet Take 1 tablet by mouth as needed for Erectile Dysfunction. 8 tablet 11     No current facility-administered medications for this visit.     PHARMACY to use: see below           Pharmacy preference(s) on file:   [id number] - [place], [place] - [address] [facility] [address]  [address]  [facility] [address]  Phone: [phone] Fax: [phone]      CALL BACK INFO: Ok to leave response (including medical information) on answering machine      PCP: Chante Steward MD         INS: Payor: [employer] / Plan: [id number] / Product Type: PPO MISC   PATIENT ADDRESS:  [address] [place] [id number]     no

## 2024-09-25 NOTE — CONSULT NOTE ADULT - ATTENDING COMMENTS
The patient was seen and examined, chart and notes reviewed.  The current diagnosis, plan of care and alternatives have been discussed with the patient.  All questions have been answered and updates have been discussed.  The case was discussed with B team residents/PA's and medical students at morning B surgical rounds and throughout the course of the day.    Sepsis secondary to peritonitis  a.  s/p small bowel resection  b.  On zosyn to complete 4 day course    Hyponatremia  a.  Suspect acute nature in light of presumed hypovolemia  b.  Na deficit noted  c.  Continue NSS resuscitation  d.  Check Na q 6    At risk for malnutrition  a.  NPO   b  NGT in place    Respiratory abnormality  a.  On nasal cannula  b.  Encourage incentive spirometry  c.  Postural drainage advised    May transfer to floor

## 2024-09-25 NOTE — PATIENT PROFILE ADULT - FALL HARM RISK - HARM RISK INTERVENTIONS
Assistance with ambulation/Assistance OOB with selected safe patient handling equipment/Communicate Risk of Fall with Harm to all staff/Monitor gait and stability/Reinforce activity limits and safety measures with patient and family/Review medications for side effects contributing to fall risk/Sit up slowly, dangle for a short time, stand at bedside before walking/Tailored Fall Risk Interventions/Toileting schedule using arm’s reach rule for commode and bathroom/Use of alarms - bed, chair and/or voice tab/Visual Cue: Yellow wristband and red socks/Bed in lowest position, wheels locked, appropriate side rails in place/Call bell, personal items and telephone in reach/Instruct patient to call for assistance before getting out of bed or chair/Non-slip footwear when patient is out of bed/[place] to call system/Physically safe environment - no spills, clutter or unnecessary equipment/Purposeful Proactive Rounding/Room/bathroom lighting operational, light cord in reach

## 2024-09-25 NOTE — CONSULT NOTE ADULT - SUBJECTIVE AND OBJECTIVE BOX
SICU Consult Note    HPI:    64F p/w lower abdominal pain. Patient feels like hernia is hard to touch and painful. Has not have a bowel movement in ~2 weeks. Has been having 3 weeks of abdominal pain but has worsened recently.  She has not passed gas from below in over 7 days. Initially had vomiting ~3 weeks ago but has not more recently. Also c/o heartburn. Started omeprazole for symptoms with minimal improvement.  No fevers or chills.  No surgical history.  States the hernia has been present for over 20 years and it is sometimes hard and up but says it would pass after she had a bowel movement.   No bright red blood per rectum. In the ED, hemodynamically stable. Lactate 2.5, leukocytosis, CT showing closed loop bowel obstruction in the hernia with perforation. Pt is now s/p ex lap, SBR, umbilical hernia repair w/ lipectomy. Feculent peritonitis w/ purulence encountered intra-operatively. SICU consulted for post-operative hyponatremia and HD monitoring i/s/o case complexity and purulence.     PMH:   PAST MEDICAL HISTORY:  No pertinent past medical history.     PAST SURGICAL HISTORY:  No significant past surgical history.     FAMILY HISTORY:  No pertinent family history in first degree relatives. No pertinent family history of: noncontributory.      ALLERGIES:  No Known Allergies      --------------------------------------------------------------------------------------    MEDICATIONS:    Neurologic Medications  fentaNYL    Injectable 25 MICROGram(s) IV Push every 5 minutes PRN Moderate Pain (4 - 6)  fentaNYL    Injectable 50 MICROGram(s) IV Push every 15 minutes PRN Severe Pain (7 - 10)  ondansetron Injectable 4 milliGRAM(s) IV Push once PRN Nausea and/or Vomiting    Respiratory Medications    Cardiovascular Medications    Gastrointestinal Medications  lactated ringers. 1000 milliLiter(s) IV Continuous <Continuous>    Genitourinary Medications    Hematologic/Oncologic Medications    Antimicrobial/Immunologic Medications    Endocrine/Metabolic Medications  insulin lispro (ADMELOG) corrective regimen sliding scale   SubCutaneous every 6 hours    Topical/Other Medications  chlorhexidine 4% Liquid 1 Application(s) Topical daily    --------------------------------------------------------------------------------------    VITAL SIGNS:  T(C): 36.6 (09-25-24 @ 03:00), Max: 36.6 (09-25-24 @ 03:00)  HR: 79 (09-25-24 @ 05:00) (67 - 91)  BP: 96/58 (09-25-24 @ 05:00) (96/58 - 123/68)  RR: 20 (09-25-24 @ 05:00) (14 - 20)  SpO2: 100% (09-25-24 @ 05:00) (92% - 100%)  --------------------------------------------------------------------------------------    INS AND OUTS:    09-24-24 @ 07:01  -  09-25-24 @ 05:35  --------------------------------------------------------  IN: 1480 mL / OUT: 564 mL / NET: 916 mL      --------------------------------------------------------------------------------------    EXAM    NEURO: NAD,   HEENT: NC/AT  RESPIRATORY: nonlabored respirations, normal CW expansion  CARDIO: RRR, S1S2  ABDOMEN: soft, moderate tenderness, mild distention, midline prevena holding suction, TOÑA x 1 w/ SSO (mildly bilious), NGT w/ bilious output  EXTREMITIES: normal strength, no deformities    --------------------------------------------------------------------------------------    LABS    --------------------------------------------------------------------------------------

## 2024-09-25 NOTE — BRIEF OPERATIVE NOTE - OPERATION/FINDINGS
Midline laparotomy performed, hernia sac entered carefully with immediate efflux of foul smelling pus. Necrotic omentum noted and resected in order to deliver hernia contents from sac. Hernia noted to contain omentum, colon, and small bowel. Colon appeared pink and viable. Portion of small bowel frankly necrotic with perforation. Approximately 25cm of small bowel resected using endo KANNAN blue 60mm stapler. Side to side functional end to end anastomosis performed using endo KANNAN blue 60mm stapler, common enterotomy closed using TA blue stapler and oversewn using 3-0 vicryl sutures in Lembert fashion. Small bowel and colon easily reduced intraabdominally. Hernia closed primarily using running #1 PDS. Internal retention sutures placed using #2 vicryl. Drain placed above fascia in abscess cavity and brought out LLQ. Skin closed loosely using interrupted 2-0 vicryl. Prevena dressing placed. Midline laparotomy performed, hernia sac entered carefully with immediate efflux of foul smelling pus. Necrotic omentum noted and resected in order to deliver hernia contents from sac. Hernia noted to contain omentum, colon, and small bowel. Colon appeared pink and viable. Portion of small bowel frankly necrotic with perforation. Approximately 25cm of small bowel resected using endo KANNAN purple 60mm stapler. Side to side functional end to end anastomosis performed using endo KANNAN purple 60mm stapler, common enterotomy closed using TA blue stapler and oversewn using 3-0 vicryl sutures in Lembert fashion. Small bowel and colon easily reduced intraabdominally. Hernia closed primarily using running #1 PDS. Internal retention sutures placed using #2 vicryl. Drain placed above fascia in abscess cavity and brought out LLQ. Skin closed loosely using interrupted 2-0 vicryl. Prevena dressing placed.

## 2024-09-25 NOTE — BRIEF OPERATIVE NOTE - NSICDXBRIEFPREOP_GEN_ALL_CORE_FT
PRE-OP DIAGNOSIS:  Strangulated ventral hernia 25-Sep-2024 03:56:32  Gosia Mulligan  Small bowel perforation 25-Sep-2024 03:56:39  Gosia Mulligan

## 2024-09-25 NOTE — BRIEF OPERATIVE NOTE - NSICDXBRIEFPROCEDURE_GEN_ALL_CORE_FT
PROCEDURES:  Exploratory laparotomy 25-Sep-2024 03:55:52  Gosia Mulligan  Small bowel resection 25-Sep-2024 03:55:58  Gosia Mulligan  Repair, hernia, umbilical, with lipectomy 25-Sep-2024 03:56:10  Gosia Mulligan  Resection, omentum, open 25-Sep-2024 03:56:17  Gosia Mulligan

## 2024-09-25 NOTE — PROGRESS NOTE ADULT - SUBJECTIVE AND OBJECTIVE BOX
SURGERY DAILY PROGRESS NOTE:     Overnight Events:  Patient transferred from SICU    SUBJECTIVE: Patient seen and evaluated on AM rounds. Pt is resting comfortably in bed with no complaints. Denies fever, chills, N/V, chest pain, or shortness of breath. Patient is passing gas and having bowel movements. Tolerating diet. Ambulating well. Pain is adequately controlled on current regimen.    OBJECTIVE:  Vital Signs Last 24 Hrs  T(C): 36.7 (25 Sep 2024 17:53), Max: 36.7 (25 Sep 2024 17:53)  T(F): 98.1 (25 Sep 2024 17:53), Max: 98.1 (25 Sep 2024 17:53)  HR: 86 (25 Sep 2024 17:53) (67 - 91)  BP: 109/68 (25 Sep 2024 17:53) (90/54 - 123/68)  BP(mean): 68 (25 Sep 2024 13:00) (65 - 82)  RR: 17 (25 Sep 2024 17:53) (14 - 21)  SpO2: 94% (25 Sep 2024 17:53) (94% - 100%)    Parameters below as of 25 Sep 2024 17:53  Patient On (Oxygen Delivery Method): room air      I&O's Detail    24 Sep 2024 07:01  -  25 Sep 2024 07:00  --------------------------------------------------------  IN:    IV PiggyBack: 150 mL    Lactated Ringers: 360 mL    sodium chloride 0.9%: 360 mL    Sodium Chloride 0.9% Bolus: 1000 mL  Total IN: 1870 mL    OUT:    Bulb (mL): 79 mL    Indwelling Catheter - Urethral (mL): 645 mL    Nasogastric/Oral tube (mL): 50 mL  Total OUT: 774 mL    Total NET: 1096 mL      25 Sep 2024 07:01  -  25 Sep 2024 19:53  --------------------------------------------------------  IN:    IV PiggyBack: 50 mL    sodium chloride 0.9%: 700 mL    Sodium Chloride 0.9% Bolus: 1000 mL  Total IN: 1750 mL    OUT:    Bulb (mL): 22 mL    Indwelling Catheter - Urethral (mL): 570 mL    Nasogastric/Oral tube (mL): 0 mL  Total OUT: 592 mL    Total NET: 1158 mL        Daily Height in cm: 165.1 (25 Sep 2024 02:17)    Daily     LABS:                        12.1   17.01 )-----------( 171      ( 25 Sep 2024 04:25 )             35.0     09-25    126[L]  |  90[L]  |  59[H]  ----------------------------<  111[H]  3.9   |  20[L]  |  1.30    Ca    7.2[L]      25 Sep 2024 09:28  Phos  5.1     09-25  Mg     2.10     09-25    TPro  7.2  /  Alb  3.3  /  TBili  0.8  /  DBili  x   /  AST  18  /  ALT  14  /  AlkPhos  111  09-24    PT/INR - ( 24 Sep 2024 22:11 )   PT: 16.7 sec;   INR: 1.44 ratio         PTT - ( 24 Sep 2024 22:11 )  PTT:26.9 sec  Urinalysis Basic - ( 25 Sep 2024 09:28 )    Color: x / Appearance: x / SG: x / pH: x  Gluc: 111 mg/dL / Ketone: x  / Bili: x / Urobili: x   Blood: x / Protein: x / Nitrite: x   Leuk Esterase: x / RBC: x / WBC x   Sq Epi: x / Non Sq Epi: x / Bacteria: x                PHYSICAL EXAM:  NEURO: NAD,   HEENT: NC/AT  RESPIRATORY: nonlabored respirations, normal CW expansion  ABDOMEN: soft, moderate tenderness, mild distention, midline prevena holding suction, TOÑA x 1 w/ SSO (mildly bilious), NGT w/ bilious output  EXTREMITIES: normal strength, no deformities   SURGERY DAILY PROGRESS NOTE:         SUBJECTIVE: Patient seen and evaluated. Pt is resting comfortably in bed with no complaints. Denies fever, chills, N/V, chest pain, or shortness of breath. Patient is passing gas and having bowel movements. Pain controlled.   Urinating a lot.  OBJECTIVE:  Vital Signs Last 24 Hrs  T(C): 36.7 (25 Sep 2024 17:53), Max: 36.7 (25 Sep 2024 17:53)  T(F): 98.1 (25 Sep 2024 17:53), Max: 98.1 (25 Sep 2024 17:53)  HR: 86 (25 Sep 2024 17:53) (67 - 91)  BP: 109/68 (25 Sep 2024 17:53) (90/54 - 123/68)  BP(mean): 68 (25 Sep 2024 13:00) (65 - 82)  RR: 17 (25 Sep 2024 17:53) (14 - 21)  SpO2: 94% (25 Sep 2024 17:53) (94% - 100%)    Parameters below as of 25 Sep 2024 17:53  Patient On (Oxygen Delivery Method): room air      I&O's Detail    24 Sep 2024 07:01  -  25 Sep 2024 07:00  --------------------------------------------------------  IN:    IV PiggyBack: 150 mL    Lactated Ringers: 360 mL    sodium chloride 0.9%: 360 mL    Sodium Chloride 0.9% Bolus: 1000 mL  Total IN: 1870 mL    OUT:    Bulb (mL): 79 mL    Indwelling Catheter - Urethral (mL): 645 mL    Nasogastric/Oral tube (mL): 50 mL  Total OUT: 774 mL    Total NET: 1096 mL      25 Sep 2024 07:01  -  25 Sep 2024 19:53  --------------------------------------------------------  IN:    IV PiggyBack: 50 mL    sodium chloride 0.9%: 700 mL    Sodium Chloride 0.9% Bolus: 1000 mL  Total IN: 1750 mL    OUT:    Bulb (mL): 22 mL    Indwelling Catheter - Urethral (mL): 570 mL    Nasogastric/Oral tube (mL): 0 mL  Total OUT: 592 mL    Total NET: 1158 mL        Daily Height in cm: 165.1 (25 Sep 2024 02:17)    Daily     LABS:                        12.1   17.01 )-----------( 171      ( 25 Sep 2024 04:25 )             35.0     09-25    126[L]  |  90[L]  |  59[H]  ----------------------------<  111[H]  3.9   |  20[L]  |  1.30    Ca    7.2[L]      25 Sep 2024 09:28  Phos  5.1     09-25  Mg     2.10     09-25    TPro  7.2  /  Alb  3.3  /  TBili  0.8  /  DBili  x   /  AST  18  /  ALT  14  /  AlkPhos  111  09-24    PT/INR - ( 24 Sep 2024 22:11 )   PT: 16.7 sec;   INR: 1.44 ratio         PTT - ( 24 Sep 2024 22:11 )  PTT:26.9 sec  Urinalysis Basic - ( 25 Sep 2024 09:28 )    Color: x / Appearance: x / SG: x / pH: x  Gluc: 111 mg/dL / Ketone: x  / Bili: x / Urobili: x   Blood: x / Protein: x / Nitrite: x   Leuk Esterase: x / RBC: x / WBC x   Sq Epi: x / Non Sq Epi: x / Bacteria: x                PHYSICAL EXAM:  NEURO: NAD,   HEENT: NC/AT  RESPIRATORY: nonlabored respirations, normal CW expansion  ABDOMEN: soft, moderate discomfort, no tenderness per patient, mild distention, midline prevena holding suction, TOÑA x 1 w/ SSO (mildly bilious), NGT w/ bilious output  EXTREMITIES: normal strength, no deformities, FROM   SURGERY DAILY PROGRESS NOTE:         SUBJECTIVE: Patient seen and evaluated. Pt is resting comfortably in bed with no complaints. Denies fever, chills, N/V, chest pain, or shortness of breath. Patient is passing gas and having bowel movements. Pain controlled.   Urinating a lot.  OBJECTIVE:  Vital Signs Last 24 Hrs  T(C): 36.7 (25 Sep 2024 17:53), Max: 36.7 (25 Sep 2024 17:53)  T(F): 98.1 (25 Sep 2024 17:53), Max: 98.1 (25 Sep 2024 17:53)  HR: 86 (25 Sep 2024 17:53) (67 - 91)  BP: 109/68 (25 Sep 2024 17:53) (90/54 - 123/68)  BP(mean): 68 (25 Sep 2024 13:00) (65 - 82)  RR: 17 (25 Sep 2024 17:53) (14 - 21)  SpO2: 94% (25 Sep 2024 17:53) (94% - 100%)    Parameters below as of 25 Sep 2024 17:53  Patient On (Oxygen Delivery Method): room air      I&O's Detail    24 Sep 2024 07:01  -  25 Sep 2024 07:00  --------------------------------------------------------  IN:    IV PiggyBack: 150 mL    Lactated Ringers: 360 mL    sodium chloride 0.9%: 360 mL    Sodium Chloride 0.9% Bolus: 1000 mL  Total IN: 1870 mL    OUT:    Bulb (mL): 79 mL    Indwelling Catheter - Urethral (mL): 645 mL    Nasogastric/Oral tube (mL): 50 mL  Total OUT: 774 mL    Total NET: 1096 mL      25 Sep 2024 07:01  -  25 Sep 2024 19:53  --------------------------------------------------------  IN:    IV PiggyBack: 50 mL    sodium chloride 0.9%: 700 mL    Sodium Chloride 0.9% Bolus: 1000 mL  Total IN: 1750 mL    OUT:    Bulb (mL): 22 mL    Indwelling Catheter - Urethral (mL): 570 mL    Nasogastric/Oral tube (mL): 0 mL  Total OUT: 592 mL    Total NET: 1158 mL        Daily Height in cm: 165.1 (25 Sep 2024 02:17)    Daily     LABS:                        12.1   17.01 )-----------( 171      ( 25 Sep 2024 04:25 )             35.0     09-25    126[L]  |  90[L]  |  59[H]  ----------------------------<  111[H]  3.9   |  20[L]  |  1.30    Ca    7.2[L]      25 Sep 2024 09:28  Phos  5.1     09-25  Mg     2.10     09-25    TPro  7.2  /  Alb  3.3  /  TBili  0.8  /  DBili  x   /  AST  18  /  ALT  14  /  AlkPhos  111  09-24    PT/INR - ( 24 Sep 2024 22:11 )   PT: 16.7 sec;   INR: 1.44 ratio         PTT - ( 24 Sep 2024 22:11 )  PTT:26.9 sec  Urinalysis Basic - ( 25 Sep 2024 09:28 )    Color: x / Appearance: x / SG: x / pH: x  Gluc: 111 mg/dL / Ketone: x  / Bili: x / Urobili: x   Blood: x / Protein: x / Nitrite: x   Leuk Esterase: x / RBC: x / WBC x   Sq Epi: x / Non Sq Epi: x / Bacteria: x                PHYSICAL EXAM:  NEURO: NAD, resting in chair  HEENT: NC/AT  RESPIRATORY: nonlabored respirations, normal CW expansion  ABDOMEN: soft, appropriate tenderness, mild distention, midline prevena holding suction, TOÑA x 1 w/ SSO (mildly bilious), NGT w/ minimal to no output. NGT partially self d/c'd as statlock not in place. Removed at bedside.  EXTREMITIES: normal strength, no deformities, FROM   SURGERY DAILY PROGRESS NOTE:         SUBJECTIVE: Patient seen and evaluated. Pt is resting comfortably in bed with no complaints. Denies fever, chills, N/V, chest pain, or shortness of breath. Patient is passing gas and having bowel movements. Pain controlled.   Urinating a lot.  OBJECTIVE:  Vital Signs Last 24 Hrs  T(C): 36.7 (25 Sep 2024 17:53), Max: 36.7 (25 Sep 2024 17:53)  T(F): 98.1 (25 Sep 2024 17:53), Max: 98.1 (25 Sep 2024 17:53)  HR: 86 (25 Sep 2024 17:53) (67 - 91)  BP: 109/68 (25 Sep 2024 17:53) (90/54 - 123/68)  BP(mean): 68 (25 Sep 2024 13:00) (65 - 82)  RR: 17 (25 Sep 2024 17:53) (14 - 21)  SpO2: 94% (25 Sep 2024 17:53) (94% - 100%)    Parameters below as of 25 Sep 2024 17:53  Patient On (Oxygen Delivery Method): room air      I&O's Detail    24 Sep 2024 07:01  -  25 Sep 2024 07:00  --------------------------------------------------------  IN:    IV PiggyBack: 150 mL    Lactated Ringers: 360 mL    sodium chloride 0.9%: 360 mL    Sodium Chloride 0.9% Bolus: 1000 mL  Total IN: 1870 mL    OUT:    Bulb (mL): 79 mL    Indwelling Catheter - Urethral (mL): 645 mL    Nasogastric/Oral tube (mL): 50 mL  Total OUT: 774 mL    Total NET: 1096 mL      25 Sep 2024 07:01  -  25 Sep 2024 19:53  --------------------------------------------------------  IN:    IV PiggyBack: 50 mL    sodium chloride 0.9%: 700 mL    Sodium Chloride 0.9% Bolus: 1000 mL  Total IN: 1750 mL    OUT:    Bulb (mL): 22 mL    Indwelling Catheter - Urethral (mL): 570 mL    Nasogastric/Oral tube (mL): 0 mL  Total OUT: 592 mL    Total NET: 1158 mL        Daily Height in cm: 165.1 (25 Sep 2024 02:17)    Daily     LABS:                        12.1   17.01 )-----------( 171      ( 25 Sep 2024 04:25 )             35.0     09-25    126[L]  |  90[L]  |  59[H]  ----------------------------<  111[H]  3.9   |  20[L]  |  1.30    Ca    7.2[L]      25 Sep 2024 09:28  Phos  5.1     09-25  Mg     2.10     09-25    TPro  7.2  /  Alb  3.3  /  TBili  0.8  /  DBili  x   /  AST  18  /  ALT  14  /  AlkPhos  111  09-24    PT/INR - ( 24 Sep 2024 22:11 )   PT: 16.7 sec;   INR: 1.44 ratio         PTT - ( 24 Sep 2024 22:11 )  PTT:26.9 sec  Urinalysis Basic - ( 25 Sep 2024 09:28 )    Color: x / Appearance: x / SG: x / pH: x  Gluc: 111 mg/dL / Ketone: x  / Bili: x / Urobili: x   Blood: x / Protein: x / Nitrite: x   Leuk Esterase: x / RBC: x / WBC x   Sq Epi: x / Non Sq Epi: x / Bacteria: x                PHYSICAL EXAM:  NEURO: NAD, resting in chair  HEENT: NC/AT  RESPIRATORY: nonlabored respirations, normal CW expansion  ABDOMEN: soft, appropriate tenderness, mild distention, midline prevena holding suction, TOÑA x 1 w/ SSO (mildly bilious), NGT w/ minimal to no output. NGT partially self d/c'd overnight as statlock not in place. Removed at bedside. TOÑA drain with ss output.   EXTREMITIES: normal strength, no deformities, FROM   SURGERY DAILY PROGRESS NOTE:         SUBJECTIVE: Patient seen and evaluated. Pt is resting comfortably in bed with no complaints. Denies fever, chills, N/V, chest pain, or shortness of breath. Patient is passing gas and having bowel movements. Pain controlled.   Urinating a lot.  OBJECTIVE:  Vital Signs Last 24 Hrs  T(C): 36.7 (25 Sep 2024 17:53), Max: 36.7 (25 Sep 2024 17:53)  T(F): 98.1 (25 Sep 2024 17:53), Max: 98.1 (25 Sep 2024 17:53)  HR: 86 (25 Sep 2024 17:53) (67 - 91)  BP: 109/68 (25 Sep 2024 17:53) (90/54 - 123/68)  BP(mean): 68 (25 Sep 2024 13:00) (65 - 82)  RR: 17 (25 Sep 2024 17:53) (14 - 21)  SpO2: 94% (25 Sep 2024 17:53) (94% - 100%)    Parameters below as of 25 Sep 2024 17:53  Patient On (Oxygen Delivery Method): room air      I&O's Detail    24 Sep 2024 07:01  -  25 Sep 2024 07:00  --------------------------------------------------------  IN:    IV PiggyBack: 150 mL    Lactated Ringers: 360 mL    sodium chloride 0.9%: 360 mL    Sodium Chloride 0.9% Bolus: 1000 mL  Total IN: 1870 mL    OUT:    Bulb (mL): 79 mL    Indwelling Catheter - Urethral (mL): 645 mL    Nasogastric/Oral tube (mL): 50 mL  Total OUT: 774 mL    Total NET: 1096 mL      25 Sep 2024 07:01  -  25 Sep 2024 19:53  --------------------------------------------------------  IN:    IV PiggyBack: 50 mL    sodium chloride 0.9%: 700 mL    Sodium Chloride 0.9% Bolus: 1000 mL  Total IN: 1750 mL    OUT:    Bulb (mL): 22 mL    Indwelling Catheter - Urethral (mL): 570 mL    Nasogastric/Oral tube (mL): 0 mL  Total OUT: 592 mL    Total NET: 1158 mL        Daily Height in cm: 165.1 (25 Sep 2024 02:17)    Daily     LABS:                        12.1   17.01 )-----------( 171      ( 25 Sep 2024 04:25 )             35.0     09-25    126[L]  |  90[L]  |  59[H]  ----------------------------<  111[H]  3.9   |  20[L]  |  1.30    Ca    7.2[L]      25 Sep 2024 09:28  Phos  5.1     09-25  Mg     2.10     09-25    TPro  7.2  /  Alb  3.3  /  TBili  0.8  /  DBili  x   /  AST  18  /  ALT  14  /  AlkPhos  111  09-24    PT/INR - ( 24 Sep 2024 22:11 )   PT: 16.7 sec;   INR: 1.44 ratio         PTT - ( 24 Sep 2024 22:11 )  PTT:26.9 sec  Urinalysis Basic - ( 25 Sep 2024 09:28 )    Color: x / Appearance: x / SG: x / pH: x  Gluc: 111 mg/dL / Ketone: x  / Bili: x / Urobili: x   Blood: x / Protein: x / Nitrite: x   Leuk Esterase: x / RBC: x / WBC x   Sq Epi: x / Non Sq Epi: x / Bacteria: x                PHYSICAL EXAM:  NEURO: NAD, resting in chair  HEENT: NC/AT  RESPIRATORY: nonlabored respirations, normal CW expansion  ABDOMEN: soft, appropriate tenderness, mild distention, midline prevena holding suction, TOÑA x 1 w/ SSO (mildly bilious), NGT w/ minimal to no output. TOÑA drain with ss output.   EXTREMITIES: normal strength, no deformities, FROM

## 2024-09-26 LAB
ANION GAP SERPL CALC-SCNC: 13 MMOL/L — SIGNIFICANT CHANGE UP (ref 7–14)
BUN SERPL-MCNC: 50 MG/DL — HIGH (ref 7–23)
CALCIUM SERPL-MCNC: 7.9 MG/DL — LOW (ref 8.4–10.5)
CHLORIDE SERPL-SCNC: 94 MMOL/L — LOW (ref 98–107)
CO2 SERPL-SCNC: 22 MMOL/L — SIGNIFICANT CHANGE UP (ref 22–31)
CREAT SERPL-MCNC: 1.01 MG/DL — SIGNIFICANT CHANGE UP (ref 0.5–1.3)
EGFR: 62 ML/MIN/1.73M2 — SIGNIFICANT CHANGE UP
GLUCOSE SERPL-MCNC: 113 MG/DL — HIGH (ref 70–99)
HCT VFR BLD CALC: 36 % — SIGNIFICANT CHANGE UP (ref 34.5–45)
HGB BLD-MCNC: 12.2 G/DL — SIGNIFICANT CHANGE UP (ref 11.5–15.5)
MAGNESIUM SERPL-MCNC: 2.4 MG/DL — SIGNIFICANT CHANGE UP (ref 1.6–2.6)
MCHC RBC-ENTMCNC: 33.7 PG — SIGNIFICANT CHANGE UP (ref 27–34)
MCHC RBC-ENTMCNC: 33.9 GM/DL — SIGNIFICANT CHANGE UP (ref 32–36)
MCV RBC AUTO: 99.4 FL — SIGNIFICANT CHANGE UP (ref 80–100)
NRBC # BLD: 0 /100 WBCS — SIGNIFICANT CHANGE UP (ref 0–0)
NRBC # FLD: 0 K/UL — SIGNIFICANT CHANGE UP (ref 0–0)
PHOSPHATE SERPL-MCNC: 4.2 MG/DL — SIGNIFICANT CHANGE UP (ref 2.5–4.5)
PLATELET # BLD AUTO: 155 K/UL — SIGNIFICANT CHANGE UP (ref 150–400)
POTASSIUM SERPL-MCNC: 3.5 MMOL/L — SIGNIFICANT CHANGE UP (ref 3.5–5.3)
POTASSIUM SERPL-SCNC: 3.5 MMOL/L — SIGNIFICANT CHANGE UP (ref 3.5–5.3)
RBC # BLD: 3.62 M/UL — LOW (ref 3.8–5.2)
RBC # FLD: 13.2 % — SIGNIFICANT CHANGE UP (ref 10.3–14.5)
SODIUM SERPL-SCNC: 129 MMOL/L — LOW (ref 135–145)
WBC # BLD: 19.71 K/UL — HIGH (ref 3.8–10.5)
WBC # FLD AUTO: 19.71 K/UL — HIGH (ref 3.8–10.5)

## 2024-09-26 RX ORDER — ACETAMINOPHEN 325 MG
1000 TABLET ORAL EVERY 6 HOURS
Refills: 0 | Status: DISCONTINUED | OUTPATIENT
Start: 2024-09-27 | End: 2024-09-27

## 2024-09-26 RX ORDER — POTASSIUM CHLORIDE, SODIUM CHLORIDE, CALCIUM CHLORIDE, SODIUM LACTATE, AND DEXTROSE MONOHYDRATE 1.79; 6; .2; 3.1; 5 G/1000ML; G/1000ML; G/1000ML; G/1000ML; G/1000ML
1000 INJECTION, SOLUTION INTRAVENOUS
Refills: 0 | Status: DISCONTINUED | OUTPATIENT
Start: 2024-09-26 | End: 2024-09-26

## 2024-09-26 RX ORDER — SODIUM CHLORIDE 0.9 % (FLUSH) 0.9 %
1000 SYRINGE (ML) INJECTION
Refills: 0 | Status: DISCONTINUED | OUTPATIENT
Start: 2024-09-26 | End: 2024-09-27

## 2024-09-26 RX ADMIN — Medication 1000 MILLIGRAM(S): at 06:15

## 2024-09-26 RX ADMIN — Medication 1000 MILLIGRAM(S): at 00:30

## 2024-09-26 RX ADMIN — Medication 75 MILLILITER(S): at 10:22

## 2024-09-26 RX ADMIN — Medication 50 MILLIEQUIVALENT(S): at 10:22

## 2024-09-26 RX ADMIN — PIPERACILLIN SODIUM AND TAZOBACTAM SODIUM 25 GRAM(S): 12; 1.5 INJECTION, POWDER, LYOPHILIZED, FOR SOLUTION INTRAVENOUS at 05:54

## 2024-09-26 RX ADMIN — Medication 1000 MILLIGRAM(S): at 19:15

## 2024-09-26 RX ADMIN — ENOXAPARIN SODIUM 40 MILLIGRAM(S): 150 INJECTION SUBCUTANEOUS at 18:32

## 2024-09-26 RX ADMIN — Medication 1000 MILLIGRAM(S): at 13:25

## 2024-09-26 RX ADMIN — CHLORHEXIDINE GLUCONATE ORAL RINSE 1 APPLICATION(S): 1.2 SOLUTION DENTAL at 12:57

## 2024-09-26 RX ADMIN — Medication 400 MILLIGRAM(S): at 05:54

## 2024-09-26 RX ADMIN — Medication 400 MILLIGRAM(S): at 12:57

## 2024-09-26 RX ADMIN — PIPERACILLIN SODIUM AND TAZOBACTAM SODIUM 25 GRAM(S): 12; 1.5 INJECTION, POWDER, LYOPHILIZED, FOR SOLUTION INTRAVENOUS at 14:58

## 2024-09-26 RX ADMIN — Medication 400 MILLIGRAM(S): at 23:39

## 2024-09-26 RX ADMIN — Medication 400 MILLIGRAM(S): at 18:28

## 2024-09-26 RX ADMIN — PIPERACILLIN SODIUM AND TAZOBACTAM SODIUM 25 GRAM(S): 12; 1.5 INJECTION, POWDER, LYOPHILIZED, FOR SOLUTION INTRAVENOUS at 21:49

## 2024-09-26 NOTE — PROGRESS NOTE ADULT - SUBJECTIVE AND OBJECTIVE BOX
ANESTHESIA POSTOP CHECK    64y Female POSTOP DAY 1      Vital Signs Last 24 Hrs  T(C): 36.5 (26 Sep 2024 05:58), Max: 36.7 (25 Sep 2024 17:53)  T(F): 97.7 (26 Sep 2024 05:58), Max: 98.1 (25 Sep 2024 17:53)  HR: 83 (26 Sep 2024 05:58) (78 - 88)  BP: 123/70 (26 Sep 2024 05:58) (97/55 - 123/70)  BP(mean): 68 (25 Sep 2024 13:00) (67 - 68)  RR: 17 (26 Sep 2024 05:58) (17 - 21)  SpO2: 98% (26 Sep 2024 05:58) (94% - 100%)    Parameters below as of 26 Sep 2024 05:58  Patient On (Oxygen Delivery Method): room air            [x ] NO APPARENT ANESTHESIA COMPLICATIONS

## 2024-09-26 NOTE — PROGRESS NOTE ADULT - ASSESSMENT
64F p/w lower abdominal pain. Patient feels like hernia is hard to touch and painful. Has not have a bowel movement in ~2 weeks. Has been having 3 weeks of abdominal pain but has worsened recently.  She has not passed gas from below in over 7 days. CT showing closed loop bowel obstruction in the hernia with perforation. Pt is now s/p ex lap, SBR, umbilical hernia repair w/ lipectomy. Feculent peritonitis w/ purulence encountered intra-operatively. SICU consulted for post-operative hyponatremia and HD monitoring i/s/o case complexity and purulence.     For Follow-Up:  - Zosyn from 9/25-9/29 (4d course)   - trending Na, improving  - Diet: trial CLD today, half IVF if tolerating  - continuing NS resuscitation   - Pain control PRN  - NPO with NGT w/ little to no output. Pt evaluated with NGT removed FDC d/t statlock not sticking. Removed by team.  - Edmonds removed -> TOV @ 1840  - L TOÑA drain output -> patient self removed this AM as she thought it was part of edmonds drainage   - DVT ppx: lovenox, SCDs    B Team  #[id number]

## 2024-09-26 NOTE — DISCHARGE NOTE PROVIDER - NSDCCPTREATMENT_GEN_ALL_CORE_FT
PRINCIPAL PROCEDURE  Procedure: Exploratory laparotomy with small bowel resection  Findings and Treatment: WOUND CARE:  Please keep incisions clean and dry. Please do not Scrub or rub incisions. Do not use lotion or powder on incisions  BATHING: Please do not submerge wound underwater. You may You may shower.   Do not sit in water.  You may let water run over incision.  Pat incision dry.  Do not scrub.  Keep incision clean and dry. and/or sponge bathe 48 hours after surgery.  ACTIVITY: No heavy lifting or straining. Otherwise, you may return to your usual level of physical activity. If you are taking Do not Do not drive while taking pain medications. or operate heavy machinery or make important decisions while on Do not Do not drive while taking pain medications. or operate heavy machinery or make important decisions while on Do not drive or operate heavy machinery or make important decisions while on narcotic pain medication. pain medication. pain medication. pain medication (such as Oxycodone) DO NOT Do not drive while taking pain medications. a car, operate machinery or make important decisions.  DIET: Return to your usual diet.  NOTIFY YOUR SURGEON IF: You have any bleeding that does not stop, any pus draining from your wound(s), any fever (over 100.4 F) or chills, persistent nausea/vomiting, persistent diarrhea, or if your pain is not controlled on your discharge pain medications.  FOLLOW-UP: Please follow up with your primary care physician in one week regarding your hospitalization.  Please follow up with your surgeon.  Call today for appointment in 1 week.       PRINCIPAL PROCEDURE  Procedure: Exploratory laparotomy with small bowel resection  Findings and Treatment:

## 2024-09-26 NOTE — PHYSICAL THERAPY INITIAL EVALUATION ADULT - PATIENT/FAMILY AGREES WITH PLAN
yes NORMA Parada (DO)  Pulmonary Disease; Sleep Medicine  [address] [place] [address]  Phone: [phone]  Fax: [phone]  Established Patient  Follow Up Time:     Pete Espinal)  Internal Medicine  [address]  [place] [address]  Phone: [phone]  Fax: [phone]  Established Patient  Follow Up Time:     Sharri Wyatt (DO)  Cardiovascular Disease; Internal Medicine  [address] [facility], [address]  [place] [address]  Phone: [phone]  Fax: [phone]  Established Patient  Follow Up Time:     Anderson Orona)  Internal Medicine  [address] [facility], [address], [place] [address]  Phone: [phone]  Fax: [phone]  Established Patient  Follow Up Time:    NORMA Parada (DO)  Pulmonary Disease; Sleep Medicine  [address]  [place] [address]  Phone: [phone]  Fax: [phone]  Established Patient  Follow Up Time:     Pete Espinal)  Internal Medicine  [address]  [place] [address]  Phone: [phone]  Fax: [phone]  Established Patient  Follow Up Time:     Sharri Wyatt (DO)  Cardiovascular Disease; Internal Medicine  [address] [facility], [address]  [place] [address]  Phone: [phone]  Fax: [phone]  Established Patient  Follow Up Time:     Anderson Orona)  Internal Medicine  [address] [facility], [address], [place] [address]  Phone: [phone]  Fax: [phone]  Established Patient  Follow Up Time:     Chad Cristina)  Medicine  [address]  [place] [address]  Phone: [phone]  Fax: [phone]  Follow Up Time:

## 2024-09-26 NOTE — PHYSICAL THERAPY INITIAL EVALUATION ADULT - GENERAL OBSERVATIONS, REHAB EVAL
Consult received, chart reviewed. Patient received seated in recliner chair, no apparent distress, +wound vac. Pt. agreeable to participate in PT.

## 2024-09-26 NOTE — PROGRESS NOTE ADULT - SUBJECTIVE AND OBJECTIVE BOX
SUBJECTIVE: Patient seen and evaluated. Pt is resting comfortably in bed with no complaints. Denies fever, chills, N/V, chest pain, or shortness of breath. Patient is passing gas and having bowel movements. Pain controlled.     PHYSICAL EXAM:  NEURO: NAD, resting in chair  HEENT: NC/AT  RESPIRATORY: nonlabored respirations, normal CW expansion  ABDOMEN: soft, appropriate tenderness, mild distention, midline prevena holding suction, TOÑA x 1 w/ SSO (mildly bilious), NGT w/ minimal to no output. NGT partially self d/c'd overnight as statlock not in place. Removed at bedside. TOÑA drain with ss output.   EXTREMITIES: normal strength, no deformities, FROM    Vital Signs Last 24 Hrs  T(C): 36.5 (26 Sep 2024 05:58), Max: 36.7 (25 Sep 2024 17:53)  T(F): 97.7 (26 Sep 2024 05:58), Max: 98.1 (25 Sep 2024 17:53)  HR: 83 (26 Sep 2024 05:58) (77 - 88)  BP: 123/70 (26 Sep 2024 05:58) (90/54 - 123/70)  BP(mean): 68 (25 Sep 2024 13:00) (65 - 68)  RR: 17 (26 Sep 2024 05:58) (16 - 21)  SpO2: 98% (26 Sep 2024 05:58) (94% - 100%)    Parameters below as of 26 Sep 2024 05:58  Patient On (Oxygen Delivery Method): room air        I&O's Summary    25 Sep 2024 07:01  -  26 Sep 2024 07:00  --------------------------------------------------------  IN: 3450 mL / OUT: 1522 mL / NET: 1928 mL          LABS:                        12.2   19.71 )-----------( 155      ( 26 Sep 2024 05:49 )             36.0     09-26    129[L]  |  94[L]  |  50[H]  ----------------------------<  113[H]  3.5   |  22  |  1.01    Ca    7.9[L]      26 Sep 2024 05:49  Phos  4.2     09-26  Mg     2.40     09-26    TPro  7.2  /  Alb  3.3  /  TBili  0.8  /  DBili  x   /  AST  18  /  ALT  14  /  AlkPhos  111  09-24    PT/INR - ( 24 Sep 2024 22:11 )   PT: 16.7 sec;   INR: 1.44 ratio         PTT - ( 24 Sep 2024 22:11 )  PTT:26.9 sec  Urinalysis Basic - ( 26 Sep 2024 05:49 )    Color: x / Appearance: x / SG: x / pH: x  Gluc: 113 mg/dL / Ketone: x  / Bili: x / Urobili: x   Blood: x / Protein: x / Nitrite: x   Leuk Esterase: x / RBC: x / WBC x   Sq Epi: x / Non Sq Epi: x / Bacteria: x        RADIOLOGY & ADDITIONAL STUDIES:

## 2024-09-26 NOTE — DISCHARGE NOTE PROVIDER - NSDCCPCAREPLAN_GEN_ALL_CORE_FT
PRINCIPAL DISCHARGE DIAGNOSIS  Diagnosis: Incarcerated hernia  Assessment and Plan of Treatment: OR: Exploratory Laparotomy, Incarcerated Ventral Hernia Repair, Small Bowel Resection (25cm)      SECONDARY DISCHARGE DIAGNOSES  Diagnosis: Bowel perforation  Assessment and Plan of Treatment: OR: Exploratory Lapartomy, Incarcerated Ventral Hernia Repair, Small Bowel Resection (25cm)

## 2024-09-26 NOTE — DISCHARGE NOTE PROVIDER - NSDCFUADDINST_GEN_ALL_CORE_FT
WOUND CARE: ** PLEASE KEEP ABDOMINAL PREVENA (PURPLE SPONGE) ON SUCTION AND REMOVE ON MONDAY 9/30/24. During this time, if prevena wound vac is off suction and sponge is inflated for > 2 hours, please call surgery office to notify them and possible remove, as this is increased risk for infection. To remove prevena, turn off battery pack and gently peel.   - Please keep incisions clean and dry. Please do not Scrub or rub incisions. Do not use lotion or powder on incisions.   BATHING: You may shower and/or sponge bathe. You may use warm soapy water in the shower and rinse, pat dry.  ACTIVITY: No heavy lifting or straining. Otherwise, you may return to your usual level of physical activity. If you are taking narcotic pain medication DO NOT drive a car, operate machinery or make important decisions.  DIET: Return to your usual diet.  NOTIFY YOUR SURGEON IF YOU HAVE: any bleeding that does not stop, any pus draining from your wound(s), any fever (over 100.4 F) persistent nausea/vomiting, or if your pain is not controlled on your discharge pain medications, unable to urinate.  Please follow up with your primary care physician in one week regarding your hospitalization, bring copies of your discharge paperwork.  Please follow up with your surgeon, Dr. Berny Foster. Please call [phone] to make an appointment 2-3 weeks after discharge from the hospital.

## 2024-09-26 NOTE — DISCHARGE NOTE PROVIDER - HOSPITAL COURSE
64F no PMH/PSH p/w 3 weeks abdominal pain, obstipation, found to be dehydrated w leukocytosis, elevated lactate, and CT findings of   closed loop bowel obstruction with perforation within ventral hernia. Patient was admitted to [facility], made NPO/IVF/IV Zosyn, consented and  booked emergently for the OR. Patient went to OR for Exploratory Laparotomy Repair of Incarcerated Ventral Hernia SBR (25cm) with JPdrain  placement. Surgery went will. Post operatively, SICU was consulted due to hyponatremia that was present on admission (121) and   improved post operatively (123-126-129) while patient was receiving NS. Once sodium began to improve, patient was downgraded to   regular floor. As patient began to have bowel function, NGT was removed and diet was advanced as tolerated from Clears to Low Residue   Diet. Pain was controlled on IV and then oral medications as patient tolerated oral diet. Edmonds was removed and patient is voiding without  difficulty. Patient accidentally pulled out her TOÑA drain as she thought it was part of the edmonds tubing. Patient was seen by PT who   recommended: ____________________________________ . Per Attending, patient is stable for discharge home and will follow up in office   in 2 weeks. Patient understands and agrees with above.     64F no PMH/PSH p/w 3 weeks abdominal pain, obstipation, found to be dehydrated w leukocytosis, elevated lactate, and CT findings of   closed loop bowel obstruction with perforation within ventral hernia. Patient was admitted to [facility], made NPO/IVF/IV Zosyn, consented and  booked emergently for the OR. Patient went to OR for Exploratory Laparotomy Repair of Incarcerated Ventral Hernia SBR (25cm) with JPdrain  placement. Surgery went will. Post operatively, SICU was consulted due to hyponatremia that was present on admission (121) and   improved post operatively (123-126-129) while patient was receiving NS. Once sodium began to improve, patient was downgraded to   regular floor. As patient began to have bowel function, NGT was removed and diet was advanced as tolerated from Clears to Low Residue   Diet. Pain was controlled on IV and then oral medications as patient tolerated oral diet. Edmonds was removed and patient is voiding without  difficulty. Patient accidentally pulled out her TOÑA drain as she thought it was part of the edmonds tubing. Patient was seen by PT who   recommended: home w rolling walker. Per Attending, patient is stable for discharge home and will follow up in office   in 2 weeks. Patient understands and agrees with above.

## 2024-09-26 NOTE — PHYSICAL THERAPY INITIAL EVALUATION ADULT - ORIENTATION, REHAB EVAL
Renal consulted  Planning CRRT  5/23 - RRT stopped due to instability, resume if hemodynamics improve; nephrology following   oriented to person, place, time and situation

## 2024-09-26 NOTE — DISCHARGE NOTE PROVIDER - CARE PROVIDER_API CALL
Berny Foster [facility]  Surgery  [id number] [place] [address]  Phone: [phone]  Fax: [phone]  Follow Up Time: 2 weeks

## 2024-09-26 NOTE — PHYSICAL THERAPY INITIAL EVALUATION ADULT - ADDITIONAL COMMENTS
Pt. was left seated in recliner chair post PT Evaluation, no apparent distress, all lines intact, call bell within reach.

## 2024-09-26 NOTE — DISCHARGE NOTE PROVIDER - NSDCMRMEDTOKEN_GEN_ALL_CORE_FT
acetaminophen 500 mg oral tablet: 2 tab(s) orally every 6 hours  amoxicillin-clavulanate 875 mg-125 mg oral tablet: 1 tab(s) orally 2 times a day  oxyCODONE 5 mg oral tablet: 1 tab(s) orally every 6 hours as needed for  severe pain MDD: 4 tablets

## 2024-09-27 VITALS
SYSTOLIC BLOOD PRESSURE: 123 MMHG | TEMPERATURE: 98 F | RESPIRATION RATE: 20 BRPM | DIASTOLIC BLOOD PRESSURE: 72 MMHG | OXYGEN SATURATION: 96 % | HEART RATE: 95 BPM

## 2024-09-27 LAB
ANION GAP SERPL CALC-SCNC: 12 MMOL/L — SIGNIFICANT CHANGE UP (ref 7–14)
BUN SERPL-MCNC: 27 MG/DL — HIGH (ref 7–23)
CALCIUM SERPL-MCNC: 8.5 MG/DL — SIGNIFICANT CHANGE UP (ref 8.4–10.5)
CHLORIDE SERPL-SCNC: 97 MMOL/L — LOW (ref 98–107)
CO2 SERPL-SCNC: 23 MMOL/L — SIGNIFICANT CHANGE UP (ref 22–31)
CREAT SERPL-MCNC: 0.67 MG/DL — SIGNIFICANT CHANGE UP (ref 0.5–1.3)
EGFR: 98 ML/MIN/1.73M2 — SIGNIFICANT CHANGE UP
GLUCOSE SERPL-MCNC: 87 MG/DL — SIGNIFICANT CHANGE UP (ref 70–99)
HCT VFR BLD CALC: 35.9 % — SIGNIFICANT CHANGE UP (ref 34.5–45)
HGB BLD-MCNC: 12.4 G/DL — SIGNIFICANT CHANGE UP (ref 11.5–15.5)
MAGNESIUM SERPL-MCNC: 2.5 MG/DL — SIGNIFICANT CHANGE UP (ref 1.6–2.6)
MCHC RBC-ENTMCNC: 33.9 PG — SIGNIFICANT CHANGE UP (ref 27–34)
MCHC RBC-ENTMCNC: 34.5 GM/DL — SIGNIFICANT CHANGE UP (ref 32–36)
MCV RBC AUTO: 98.1 FL — SIGNIFICANT CHANGE UP (ref 80–100)
NRBC # BLD: 0 /100 WBCS — SIGNIFICANT CHANGE UP (ref 0–0)
NRBC # FLD: 0 K/UL — SIGNIFICANT CHANGE UP (ref 0–0)
PHOSPHATE SERPL-MCNC: 2 MG/DL — LOW (ref 2.5–4.5)
PLATELET # BLD AUTO: 136 K/UL — LOW (ref 150–400)
POTASSIUM SERPL-MCNC: 3.1 MMOL/L — LOW (ref 3.5–5.3)
POTASSIUM SERPL-SCNC: 3.1 MMOL/L — LOW (ref 3.5–5.3)
RBC # BLD: 3.66 M/UL — LOW (ref 3.8–5.2)
RBC # FLD: 13 % — SIGNIFICANT CHANGE UP (ref 10.3–14.5)
SODIUM SERPL-SCNC: 132 MMOL/L — LOW (ref 135–145)
WBC # BLD: 17.36 K/UL — HIGH (ref 3.8–10.5)
WBC # FLD AUTO: 17.36 K/UL — HIGH (ref 3.8–10.5)

## 2024-09-27 RX ORDER — OXYCODONE HYDROCHLORIDE 30 MG/1
5 TABLET, FILM COATED, EXTENDED RELEASE ORAL EVERY 4 HOURS
Refills: 0 | Status: DISCONTINUED | OUTPATIENT
Start: 2024-09-27 | End: 2024-09-27

## 2024-09-27 RX ORDER — ACETAMINOPHEN 325 MG
1000 TABLET ORAL EVERY 6 HOURS
Refills: 0 | Status: DISCONTINUED | OUTPATIENT
Start: 2024-09-27 | End: 2024-09-27

## 2024-09-27 RX ORDER — ACETAMINOPHEN 325 MG
2 TABLET ORAL
Qty: 0 | Refills: 0 | DISCHARGE
Start: 2024-09-27

## 2024-09-27 RX ORDER — OXYCODONE HYDROCHLORIDE 30 MG/1
1 TABLET, FILM COATED, EXTENDED RELEASE ORAL
Qty: 12 | Refills: 0
Start: 2024-09-27 | End: 2024-09-29

## 2024-09-27 RX ORDER — OXYCODONE HYDROCHLORIDE 30 MG/1
2.5 TABLET, FILM COATED, EXTENDED RELEASE ORAL EVERY 4 HOURS
Refills: 0 | Status: DISCONTINUED | OUTPATIENT
Start: 2024-09-27 | End: 2024-09-27

## 2024-09-27 RX ORDER — SOD PHOS DI, MONO/K PHOS MONO 250 MG
2 TABLET ORAL ONCE
Refills: 0 | Status: COMPLETED | OUTPATIENT
Start: 2024-09-27 | End: 2024-09-27

## 2024-09-27 RX ORDER — SOD PHOS DI, MONO/K PHOS MONO 250 MG
1 TABLET ORAL ONCE
Refills: 0 | Status: COMPLETED | OUTPATIENT
Start: 2024-09-27 | End: 2024-09-27

## 2024-09-27 RX ADMIN — Medication 400 MILLIGRAM(S): at 05:48

## 2024-09-27 RX ADMIN — Medication 1 PACKET(S): at 10:00

## 2024-09-27 RX ADMIN — CHLORHEXIDINE GLUCONATE ORAL RINSE 1 APPLICATION(S): 1.2 SOLUTION DENTAL at 12:02

## 2024-09-27 RX ADMIN — PIPERACILLIN SODIUM AND TAZOBACTAM SODIUM 25 GRAM(S): 12; 1.5 INJECTION, POWDER, LYOPHILIZED, FOR SOLUTION INTRAVENOUS at 05:48

## 2024-09-27 RX ADMIN — Medication 1000 MILLIGRAM(S): at 12:02

## 2024-09-27 RX ADMIN — Medication 40 MILLIEQUIVALENT(S): at 10:00

## 2024-09-27 NOTE — DISCHARGE NOTE NURSING/CASE MANAGEMENT/SOCIAL WORK - PATIENT PORTAL LINK FT
You can access the FollowMyHealth Patient Portal offered by [facility] by registering at the following website: [email or web address]. By joining [employer]’s FollowMyHealth portal, you will also be able to view your health information using other applications (apps) compatible with our system.

## 2024-09-27 NOTE — PROGRESS NOTE ADULT - SUBJECTIVE AND OBJECTIVE BOX
TEAM [ B ] Surgery Daily Progress Note  =====================================================    SUBJECTIVE: Patient seen and examined at bedside on AM rounds. Patient reports that they're feeling well. She is passing gas and having soft bowel movements. She is pain controlled and tolerating her diet. Denies fever, chills, N/V, chest pain, SOB    ALLERGIES:  No Known Allergies    -------------------------------------------------------------------------------------    MEDICATIONS:  acetaminophen     Tablet .. 1000 milliGRAM(s) Oral every 6 hours  chlorhexidine 2% Cloths 1 Application(s) Topical daily  enoxaparin Injectable 40 milliGRAM(s) SubCutaneous every 24 hours  influenza   Vaccine 0.5 milliLiter(s) IntraMuscular once  oxyCODONE    IR 2.5 milliGRAM(s) Oral every 4 hours PRN  oxyCODONE    IR 5 milliGRAM(s) Oral every 4 hours PRN  piperacillin/tazobactam IVPB.. 3.375 Gram(s) IV Intermittent every 8 hours    --------------------------------------------------------------------------------------    VITAL SIGNS:  T(C): 36.9 (09-27-24 @ 05:45), Max: 36.9 (09-27-24 @ 05:45)  HR: 73 (09-27-24 @ 05:45) (73 - 90)  BP: 129/77 (09-27-24 @ 05:45) (104/58 - 129/77)  RR: 17 (09-27-24 @ 05:45) (17 - 20)  SpO2: 98% (09-27-24 @ 05:45) (96% - 99%)  --------------------------------------------------------------------------------------    INS AND OUTS:    09-26-24 @ 07:01  -  09-27-24 @ 07:00  --------------------------------------------------------  IN: 3680 mL / OUT: 1170 mL / NET: 2510 mL      --------------------------------------------------------------------------------------      EXAM    General: NAD, resting in bed comfortably.  Cardiac: regular rate, warm and well perfused  Respiratory: Nonlabored respirations, normal cw expansion.  Abdomen: soft, nontender, nondistended, midline prevena c/d/i  Extremities: normal strength, FROM, no deformities    --------------------------------------------------------------------------------------    LABS                        12.4   17.36 )-----------( 136      ( 27 Sep 2024 06:34 )             35.9   09-27    132[L]  |  97[L]  |  27[H]  ----------------------------<  87  3.1[L]   |  23  |  0.67    Ca    8.5      27 Sep 2024 06:34  Phos  2.0     09-27  Mg     2.50     09-27

## 2024-09-27 NOTE — PROGRESS NOTE ADULT - ASSESSMENT
64 year old female s/p ex lap, SBR, enterotomy repair, and umbilical hernia repair on 9/25 for strangulated hernia    Plan  - Diet: LRD  - Pain control  - Zosyn  - DVT prophylaxis  - Repeat BMP 12pm, if hyponatremia improves plan for dc home    B team surgery [id number]

## 2024-10-03 LAB — SURGICAL PATHOLOGY STUDY: SIGNIFICANT CHANGE UP

## (undated) DEVICE — SUT VICRYL PLUS 2-0 18" TIES UNDYED

## (undated) DEVICE — WARMING BLANKET FULL UNDERBODY

## (undated) DEVICE — VENODYNE/SCD SLEEVE CALF MEDIUM

## (undated) DEVICE — SUT VICRYL 2-0 27" SH UNDYED

## (undated) DEVICE — ELCTR BOVIE PENCIL SMOKE EVACUATION

## (undated) DEVICE — SOL IRR POUR H2O 1500ML

## (undated) DEVICE — SUT SILK 3-0 18" SH (POP-OFF)

## (undated) DEVICE — POOLE SUCTION TIP

## (undated) DEVICE — SUT MAXON 1 36" GS-24

## (undated) DEVICE — LIGASURE IMPACT

## (undated) DEVICE — SOL IRR POUR NS 0.9% 1500ML

## (undated) DEVICE — PACK MAJOR ABDOMINAL WITH LAP

## (undated) DEVICE — DRAIN RESERVOIR FOR JACKSON PRATT 100CC CARDINAL

## (undated) DEVICE — DRAPE SOL WARMING 44X44IN

## (undated) DEVICE — PROTECTOR HEEL / ELBOW FLUFFY

## (undated) DEVICE — STAPLER SKIN VISI-STAT 35 WIDE

## (undated) DEVICE — DRSG PREVENA PEEL & PLACE KIT 20CM

## (undated) DEVICE — FOLEY TRAY 16FR 5CC LF UMETER CLOSED

## (undated) DEVICE — STAPLER COVIDIEN ENDO GIA SHORT HANDLE

## (undated) DEVICE — SUT VICRYL 0 18" TIES UNDYED

## (undated) DEVICE — ELCTR GROUNDING PAD ADULT COVIDIEN

## (undated) DEVICE — POSITIONER STRAP ARMBOARD VELCRO TS-30

## (undated) DEVICE — ELCTR BOVIE BLADE 3/4" EXTENDED LENGTH 6"

## (undated) DEVICE — GLV 7.5 PROTEXIS (WHITE)